# Patient Record
Sex: FEMALE | Race: WHITE | Employment: FULL TIME | URBAN - METROPOLITAN AREA
[De-identification: names, ages, dates, MRNs, and addresses within clinical notes are randomized per-mention and may not be internally consistent; named-entity substitution may affect disease eponyms.]

---

## 2017-10-17 PROBLEM — G50.0 TRIGEMINAL NEURALGIA OF RIGHT SIDE OF FACE: Status: ACTIVE | Noted: 2017-10-17

## 2018-04-02 PROBLEM — I10 MALIGNANT HYPERTENSION: Status: ACTIVE | Noted: 2018-04-02

## 2019-07-23 ENCOUNTER — HOSPITAL ENCOUNTER (OUTPATIENT)
Dept: VASCULAR SURGERY | Age: 49
Discharge: HOME | End: 2019-07-23
Payer: COMMERCIAL

## 2019-07-23 DIAGNOSIS — I10 ESSENTIAL HYPERTENSION, MALIGNANT: ICD-10-CM

## 2019-07-23 PROCEDURE — 93975 VASCULAR STUDY: CPT

## 2019-07-24 LAB
ABDOMINAL PROX AORTA VEL: 114.8 CM/S
CELIAC PSV: 278.4 CM/S
LEFT KIDNEY LENGTH: 12.39 CM
LEFT KIDNEY WIDTH: 5.15 CM
LEFT RENAL DIST DIAS: 56.8 CM/S
LEFT RENAL DIST RAR: 1.37
LEFT RENAL DIST RI: 0.64
LEFT RENAL DIST SYS: 157.8 CM/S
LEFT RENAL LOWER PARENCHYMA MAX: 34 CM/S
LEFT RENAL LOWER PARENCHYMA MIN: 14.8 CM/S
LEFT RENAL LOWER PARENCHYMA RI: 0.56
LEFT RENAL MID DIAS: 37.1 CM/S
LEFT RENAL MID RAR: 1.09
LEFT RENAL MID RI: 0.7
LEFT RENAL MID SYS: 124.9 CM/S
LEFT RENAL MIDDLE PARENCHYMA MAX: 29.4 CM/S
LEFT RENAL MIDDLE PARENCHYMA MIN: 15.7 CM/S
LEFT RENAL MIDDLE PARENCHYMA RI: 0.47
LEFT RENAL PROX DIAS: 29.5 CM/S
LEFT RENAL PROX RAR: 0.61
LEFT RENAL PROX RI: 0.58
LEFT RENAL PROX SYS: 70.1 CM/S
LEFT RENAL UPPER PARENCHYMA MAX: 29.4 CM/S
LEFT RENAL UPPER PARENCHYMA MIN: 15.7 CM/S
LEFT RENAL UPPER PARENCHYMA RI: 0.47
PROX AORTIC AP: 1.37 CM
PROX AORTIC TRANS: 1.33 CM
PROX SMA PSV: 309.2 CM/S
RIGHT KIDNEY LENGTH: 10.83 CM
RIGHT KIDNEY WIDTH: 5.12 CM
RIGHT RENAL DIST DIAS: 75 CM/S
RIGHT RENAL DIST RAR: 1.41
RIGHT RENAL DIST RI: 0.54
RIGHT RENAL DIST SYS: 162.4 CM/S
RIGHT RENAL LOWER PARENCHYMA MAX: 25.8 CM/S
RIGHT RENAL LOWER PARENCHYMA MIN: 11 CM/S
RIGHT RENAL LOWER PARENCHYMA RI: 0.57
RIGHT RENAL MID DIAS: 58.9 CM/S
RIGHT RENAL MID RAR: 1.22
RIGHT RENAL MID RI: 0.58
RIGHT RENAL MID SYS: 139.7 CM/S
RIGHT RENAL MIDDLE PARENCHYMA MAX: 44.9 CM/S
RIGHT RENAL MIDDLE PARENCHYMA MIN: 18.4 CM/S
RIGHT RENAL MIDDLE PARENCHYMA RI: 0.59
RIGHT RENAL PROX DIAS: 62.1 CM/S
RIGHT RENAL PROX RAR: 1.4
RIGHT RENAL PROX RI: 0.61
RIGHT RENAL PROX SYS: 160.5 CM/S
RIGHT RENAL UPPER PARENCHYMA MAX: 43.1 CM/S
RIGHT RENAL UPPER PARENCHYMA MIN: 18.4 CM/S
RIGHT RENAL UPPER PARENCHYMA RI: 0.57

## 2019-09-19 PROBLEM — I10 MALIGNANT HYPERTENSION: Status: RESOLVED | Noted: 2018-04-02 | Resolved: 2019-09-19

## 2019-09-19 PROBLEM — E11.59 HYPERTENSION COMPLICATING DIABETES (HCC): Status: ACTIVE | Noted: 2019-09-19

## 2019-09-19 PROBLEM — Z79.899 ENCOUNTER FOR LONG-TERM (CURRENT) USE OF OTHER MEDICATIONS: Status: ACTIVE | Noted: 2019-09-19

## 2019-09-19 PROBLEM — E11.9 CONTROLLED TYPE 2 DIABETES MELLITUS WITHOUT COMPLICATION, WITHOUT LONG-TERM CURRENT USE OF INSULIN (HCC): Status: ACTIVE | Noted: 2019-09-19

## 2019-09-19 PROBLEM — I15.2 HYPERTENSION COMPLICATING DIABETES (HCC): Status: ACTIVE | Noted: 2019-09-19

## 2020-03-02 ENCOUNTER — HOSPITAL ENCOUNTER (OUTPATIENT)
Dept: CT IMAGING | Age: 50
Discharge: HOME | End: 2020-03-02
Attending: SURGERY
Payer: COMMERCIAL

## 2020-03-02 DIAGNOSIS — K55.9 VASCULAR INSUFFICIENCY OF INTESTINE (HCC): ICD-10-CM

## 2020-03-02 DIAGNOSIS — K55.1: ICD-10-CM

## 2020-03-02 LAB — CREAT BLD-MCNC: 0.5 MG/DL (ref 0.6–1.3)

## 2020-03-02 PROCEDURE — 74174 CTA ABD&PLVS W/CONTRAST: CPT

## 2020-03-02 PROCEDURE — 74011000258 HC RX REV CODE- 258: Performed by: RADIOLOGY

## 2020-03-02 PROCEDURE — 74011636320 HC RX REV CODE- 636/320: Performed by: RADIOLOGY

## 2020-03-02 PROCEDURE — 82565 ASSAY OF CREATININE: CPT

## 2020-03-02 RX ORDER — SODIUM CHLORIDE 0.9 % (FLUSH) 0.9 %
10 SYRINGE (ML) INJECTION
Status: COMPLETED | OUTPATIENT
Start: 2020-03-02 | End: 2020-03-02

## 2020-03-02 RX ADMIN — Medication 10 ML: at 11:59

## 2020-03-02 RX ADMIN — IOPAMIDOL 100 ML: 755 INJECTION, SOLUTION INTRAVENOUS at 11:59

## 2020-03-02 RX ADMIN — SODIUM CHLORIDE 100 ML: 900 INJECTION, SOLUTION INTRAVENOUS at 11:59

## 2020-05-13 ENCOUNTER — HOSPITAL ENCOUNTER (OUTPATIENT)
Dept: MRI IMAGING | Age: 50
Discharge: HOME | End: 2020-05-13
Attending: INTERNAL MEDICINE
Payer: COMMERCIAL

## 2020-05-13 VITALS — WEIGHT: 177 LBS | BODY MASS INDEX: 31.35 KG/M2

## 2020-05-13 DIAGNOSIS — R51.9 NONINTRACTABLE HEADACHE, UNSPECIFIED CHRONICITY PATTERN, UNSPECIFIED HEADACHE TYPE: ICD-10-CM

## 2020-05-13 DIAGNOSIS — H53.9 VISUAL DISTURBANCE OF ONE EYE: ICD-10-CM

## 2020-05-13 DIAGNOSIS — H53.2 DOUBLE VISION: ICD-10-CM

## 2020-05-13 PROCEDURE — A9575 INJ GADOTERATE MEGLUMI 0.1ML: HCPCS | Performed by: INTERNAL MEDICINE

## 2020-05-13 PROCEDURE — 74011250636 HC RX REV CODE- 250/636: Performed by: INTERNAL MEDICINE

## 2020-05-13 PROCEDURE — 70553 MRI BRAIN STEM W/O & W/DYE: CPT

## 2020-05-13 RX ORDER — GADOTERATE MEGLUMINE 376.9 MG/ML
15 INJECTION INTRAVENOUS ONCE
Status: COMPLETED | OUTPATIENT
Start: 2020-05-13 | End: 2020-05-13

## 2020-05-13 RX ADMIN — GADOTERATE MEGLUMINE 15 ML: 376.9 INJECTION INTRAVENOUS at 12:21

## 2020-09-15 ENCOUNTER — HOSPITAL ENCOUNTER (OUTPATIENT)
Dept: GENERAL RADIOLOGY | Age: 50
Discharge: HOME | End: 2020-09-15
Attending: INTERNAL MEDICINE
Payer: COMMERCIAL

## 2020-09-15 DIAGNOSIS — M25.559 HIP PAIN: ICD-10-CM

## 2020-09-15 PROCEDURE — 73521 X-RAY EXAM HIPS BI 2 VIEWS: CPT

## 2020-12-03 ENCOUNTER — HOSPITAL ENCOUNTER (OUTPATIENT)
Dept: ULTRASOUND IMAGING | Age: 50
Discharge: HOME | End: 2020-12-03
Attending: FAMILY MEDICINE
Payer: COMMERCIAL

## 2020-12-03 DIAGNOSIS — R10.11 RUQ ABDOMINAL PAIN: ICD-10-CM

## 2020-12-03 PROCEDURE — 76705 ECHO EXAM OF ABDOMEN: CPT

## 2020-12-10 ENCOUNTER — HOSPITAL ENCOUNTER (OUTPATIENT)
Dept: NUCLEAR MEDICINE | Age: 50
Discharge: HOME | End: 2020-12-10
Attending: INTERNAL MEDICINE
Payer: COMMERCIAL

## 2020-12-10 VITALS — WEIGHT: 189 LBS | BODY MASS INDEX: 33.48 KG/M2

## 2020-12-10 DIAGNOSIS — R10.11 RUQ PAIN: ICD-10-CM

## 2020-12-10 DIAGNOSIS — R79.89 ELEVATED LFTS: ICD-10-CM

## 2020-12-10 PROCEDURE — 74011000258 HC RX REV CODE- 258: Performed by: INTERNAL MEDICINE

## 2020-12-10 PROCEDURE — 78227 HEPATOBIL SYST IMAGE W/DRUG: CPT

## 2020-12-10 PROCEDURE — 74011250636 HC RX REV CODE- 250/636: Performed by: INTERNAL MEDICINE

## 2020-12-10 RX ORDER — SODIUM CHLORIDE 9 MG/ML
50 INJECTION, SOLUTION INTRAVENOUS
Status: COMPLETED | OUTPATIENT
Start: 2020-12-10 | End: 2020-12-10

## 2020-12-10 RX ADMIN — SODIUM CHLORIDE 50 ML: 900 INJECTION, SOLUTION INTRAVENOUS at 12:30

## 2020-12-10 RX ADMIN — SINCALIDE 1.71 MCG: 5 INJECTION, POWDER, LYOPHILIZED, FOR SOLUTION INTRAVENOUS at 12:30

## 2020-12-15 ENCOUNTER — OFFICE VISIT (OUTPATIENT)
Dept: SURGERY | Age: 50
End: 2020-12-15
Payer: COMMERCIAL

## 2020-12-15 VITALS
HEART RATE: 106 BPM | DIASTOLIC BLOOD PRESSURE: 99 MMHG | HEIGHT: 63 IN | WEIGHT: 189 LBS | RESPIRATION RATE: 18 BRPM | BODY MASS INDEX: 33.49 KG/M2 | SYSTOLIC BLOOD PRESSURE: 152 MMHG | TEMPERATURE: 98.7 F | OXYGEN SATURATION: 96 %

## 2020-12-15 DIAGNOSIS — R74.8 ELEVATED LIVER ENZYMES: ICD-10-CM

## 2020-12-15 DIAGNOSIS — K82.8 BILIARY DYSKINESIA: Primary | ICD-10-CM

## 2020-12-15 PROCEDURE — 99204 OFFICE O/P NEW MOD 45 MIN: CPT | Performed by: SURGERY

## 2020-12-15 NOTE — PROGRESS NOTES
[employer] [facility] at [facility] Surgery History and Physical    History of Present Illness:      Nadiya Mitchell is a 48 y.o. female who has been having right upper quadrant epigastric abdominal pain for the last few months. She periodically has epigastric and right upper quadrant pain that appears to be mostly random. It is not always related to eating food. When she has the pain it can be an 8 out of 10. Sometimes the pain can last 6 to 8 hours. She has had some nausea. She has had no changes in bowel movements no diarrhea    Past Medical History:   Diagnosis Date    Anorexia     Bulimia     Depression     Endometriosis     High cholesterol     Kidney stone     Migraines     Ovarian cyst     Proteinuria     mild       Past Surgical History:   Procedure Laterality Date    HX GYN      endometrial oblation  11/03    HX MELLY AND BSO  02/2020         Current Outpatient Medications:     dextroamphetamine-amphetamine (ADDERALL) 20 mg tablet, Take 1 Tab by mouth two (2) times a day., Disp: 60 Tab, Rfl: 0    amLODIPine (NORVASC) 10 mg tablet, Take 1 Tab by mouth daily. , Disp: 90 Tab, Rfl: 3    estradioL (Estrace) 1 mg tablet, Take 1 mg by mouth daily. , Disp: , Rfl:     FLUoxetine (PROZAC) 20 mg capsule, Take 1 Cap by mouth daily. Take 4 capsules QD, Disp: 360 Cap, Rfl: 3    nebivolol (BYSTOLIC) 20 mg tablet, Take 40 mg by mouth daily. , Disp: , Rfl:     atorvastatin (LIPITOR) 20 mg tablet, Take 1 Tab by mouth daily. , Disp: 90 Tab, Rfl: 3    lisinopril-hydroCHLOROthiazide (PRINZIDE, ZESTORETIC) 20-12.5 mg per tablet, Take 2 Tabs by mouth daily. , Disp: 180 Tab, Rfl: 3    metFORMIN (GLUCOPHAGE) 500 mg tablet, Take 1 Tab by mouth daily (with breakfast). , Disp: 30 Tab, Rfl: 6    butalbital-acetaminophen-caffeine (FIORICET, ESGIC) -40 mg per tablet, Take 1-2 Tabs by mouth four (4) times daily as needed for Headache., Disp: 30 Tab, Rfl: 5    Allergies   Allergen Reactions    Sulfa (Sulfonamide Antibiotics) Unknown (comments)       Social History     Socioeconomic History    Marital status:      Spouse name: Not on file    Number of children: Not on file    Years of education: Not on file    Highest education level: Not on file   Occupational History    Not on file   Social Needs    Financial resource strain: Not on file    Food insecurity     Worry: Not on file     Inability: Not on file    Transportation needs     Medical: Not on file     Non-medical: Not on file   Tobacco Use    Smoking status: Never Smoker    Smokeless tobacco: Never Used   Substance and Sexual Activity    Alcohol use: Not on file    Drug use: Not on file    Sexual activity: Not on file   Lifestyle    Physical activity     Days per week: Not on file     Minutes per session: Not on file    Stress: Not on file   Relationships    Social connections     Talks on phone: Not on file     Gets together: Not on file     Attends Jew service: Not on file     Active member of club or organization: Not on file     Attends meetings of clubs or organizations: Not on file     Relationship status: Not on file    Intimate partner violence     Fear of current or ex partner: Not on file     Emotionally abused: Not on file     Physically abused: Not on file     Forced sexual activity: Not on file   Other Topics Concern    Not on file   Social History Narrative    Not on file       Family History   Problem Relation Age of Onset    Diabetes Father     Arthritis-rheumatoid Father     Diabetes Sister        ROS   Constitutional: negative  Ears, Nose, Mouth, Throat, and Face: negative  Respiratory: negative  Cardiovascular: negative  Gastrointestinal: positive for nausea and abdominal pain  Genitourinary:negative  Integument/Breast: negative  Hematologic/Lymphatic: negative  Behavioral/Psychiatric: negative  Allergic/Immunologic: negative      Physical Exam:     Visit Vitals  BP (!) 152/99 (BP 1 Location: Left arm, BP Patient Position: Sitting)   Pulse (!) 106   Temp 98.7 °F (37.1 °C) (Oral)   Resp 18   Ht 5' 3\" (1.6 m)   Wt 189 lb (85.7 kg)   SpO2 96%   BMI 33.48 kg/m²       General - alert and oriented, no apparent distress  HEENT - no jaundice, no hearing imparement  Pulm - CTAB, no C/W/R  CV - RRR, no M/R/G  Abd -soft, nondistended, bowel sounds present, mild tenderness to palpation right upper quadrant epigastric, no hernia  Ext - pulses intact in UE and LE bilaterally, no edema  Skin - supple, no rashes  Psychiatric - normal affect, good mood    Labs  Lab Results   Component Value Date/Time    Sodium 139 12/08/2020 08:15 AM    Potassium 4.6 12/08/2020 08:15 AM    Chloride 101 12/08/2020 08:15 AM    CO2 22 12/08/2020 08:15 AM    Glucose 148 (H) 12/08/2020 08:15 AM    BUN 10 12/08/2020 08:15 AM    Creatinine 0.64 12/08/2020 08:15 AM    BUN/Creatinine ratio 16 12/08/2020 08:15 AM    GFR est  12/08/2020 08:15 AM    GFR est non- 12/08/2020 08:15 AM    Calcium 9.4 12/08/2020 08:15 AM    Bilirubin, total 0.3 12/08/2020 08:15 AM    Alk. phosphatase 131 (H) 12/08/2020 08:15 AM    Protein, total 7.2 12/08/2020 08:15 AM    Albumin 4.1 12/08/2020 08:15 AM    A-G Ratio 1.3 12/08/2020 08:15 AM    ALT (SGPT) 53 (H) 12/08/2020 08:15 AM    AST (SGOT) 20 12/08/2020 08:15 AM     Lab Results   Component Value Date/Time    WBC 5.3 12/08/2020 08:15 AM    HGB (POC) 12.6 04/02/2018 11:50 AM    HGB 13.6 12/08/2020 08:15 AM    HCT (POC) 38.9 04/02/2018 11:50 AM    HCT 39.8 12/08/2020 08:15 AM    PLATELET 561 96/96/8342 08:15 AM    MCV 90 12/08/2020 08:15 AM         Imaging  RUQ US - normal    HIDA -ejection fraction 11% otherwise normal  I have reviewed and agree with all of the pertinent images    Assessment:     Kevin Bower is a 48 y.o. female with biliary dyskinesia    Recommendations:     1. She does appear to have biliary dyskinesia.   She also describes having some slightly elevated liver enzymes on her previous labs. On her current labs her liver enzymes are slightly elevated. Due to this I will also do a cholangiogram at the time of operation. She will need to be scheduled for laparoscopic cholecystectomy with cholangiogram. I have discussed the above procedure with the patient in detail. We reviewed the benefits and possible complications of the surgery which include bleeding, infection, damage to adjacent organs, venous thromboembolism, need for repeat surgery, death and other unforseen complications. The patient agreed to proceed with the surgery. Cody Coleman MD    Greater than half of the time: 30 minutes was used in counciling the patient about diagnosis and treatment plan    Ms. Yvonne Bey has a reminder for a \"due or due soon\" health maintenance. I have asked that she contact her primary care provider for follow-up on this health maintenance.

## 2020-12-15 NOTE — LETTER
12/15/20 Patient: Tremayne Cai YOB: 1970 Date of Visit: 12/15/2020 Vignesh Blake MD 
[facility] 7 [id number] VIA In Basket Dear Vignesh Blake MD, Thank you for referring Ms. John Murphy to [facility] for evaluation. My notes for this consultation are attached. If you have questions, please do not hesitate to call me. I look forward to following your patient along with you. Sincerely, Carlee Leon MD

## 2020-12-15 NOTE — PROGRESS NOTES
1. Have you been to the ER, urgent care clinic since your last visit? Hospitalized since your last visit? No    2. Have you seen or consulted any other health care providers outside of the [address] [place] [address] since your last visit? Include any pap smears or colon screening.  No

## 2020-12-16 ENCOUNTER — PATIENT MESSAGE (OUTPATIENT)
Dept: SURGERY | Age: 50
End: 2020-12-16

## 2020-12-27 ENCOUNTER — HOSPITAL ENCOUNTER (EMERGENCY)
Age: 50
Discharge: HOME | End: 2020-12-27
Attending: EMERGENCY MEDICINE | Admitting: SURGERY
Payer: COMMERCIAL

## 2020-12-27 ENCOUNTER — ANESTHESIA (OUTPATIENT)
Dept: SURGERY | Age: 50
End: 2020-12-27
Payer: COMMERCIAL

## 2020-12-27 ENCOUNTER — ANESTHESIA EVENT (OUTPATIENT)
Dept: SURGERY | Age: 50
End: 2020-12-27
Payer: COMMERCIAL

## 2020-12-27 ENCOUNTER — APPOINTMENT (OUTPATIENT)
Dept: ULTRASOUND IMAGING | Age: 50
End: 2020-12-27
Attending: NURSE PRACTITIONER
Payer: COMMERCIAL

## 2020-12-27 VITALS
TEMPERATURE: 97.5 F | HEART RATE: 97 BPM | RESPIRATION RATE: 12 BRPM | DIASTOLIC BLOOD PRESSURE: 76 MMHG | OXYGEN SATURATION: 87 % | SYSTOLIC BLOOD PRESSURE: 137 MMHG

## 2020-12-27 DIAGNOSIS — K80.50 BILIARY COLIC: Primary | ICD-10-CM

## 2020-12-27 DIAGNOSIS — K81.0 ACUTE CHOLECYSTITIS: ICD-10-CM

## 2020-12-27 DIAGNOSIS — Z20.822 SUSPECTED COVID-19 VIRUS INFECTION: ICD-10-CM

## 2020-12-27 PROBLEM — K80.00 ACUTE CHOLECYSTITIS DUE TO BILIARY CALCULUS: Status: ACTIVE | Noted: 2020-12-27

## 2020-12-27 PROBLEM — K80.20 SYMPTOMATIC CHOLELITHIASIS: Status: ACTIVE | Noted: 2020-12-27

## 2020-12-27 LAB
ALBUMIN SERPL-MCNC: 3.7 G/DL (ref 3.5–5)
ALBUMIN/GLOB SERPL: 0.9 {RATIO} (ref 1.1–2.2)
ALP SERPL-CCNC: 121 U/L (ref 45–117)
ALT SERPL-CCNC: 218 U/L (ref 12–78)
ANION GAP SERPL CALC-SCNC: 3 MMOL/L (ref 5–15)
APPEARANCE UR: CLEAR
AST SERPL-CCNC: 284 U/L (ref 15–37)
ATRIAL RATE: 79 BPM
BACTERIA URNS QL MICRO: NEGATIVE /HPF
BASOPHILS # BLD: 0 K/UL (ref 0–0.1)
BASOPHILS NFR BLD: 1 % (ref 0–1)
BILIRUB SERPL-MCNC: 0.7 MG/DL (ref 0.2–1)
BILIRUB UR QL: NEGATIVE
BUN SERPL-MCNC: 11 MG/DL (ref 6–20)
BUN/CREAT SERPL: 16 (ref 12–20)
CALCIUM SERPL-MCNC: 8.8 MG/DL (ref 8.5–10.1)
CALCULATED P AXIS, ECG09: 56 DEGREES
CALCULATED R AXIS, ECG10: 46 DEGREES
CALCULATED T AXIS, ECG11: 40 DEGREES
CHLORIDE SERPL-SCNC: 106 MMOL/L (ref 97–108)
CO2 SERPL-SCNC: 28 MMOL/L (ref 21–32)
COLOR UR: ABNORMAL
COMMENT, HOLDF: NORMAL
COVID-19 RAPID TEST, COVR: NOT DETECTED
CREAT SERPL-MCNC: 0.69 MG/DL (ref 0.55–1.02)
DIAGNOSIS, 93000: NORMAL
DIFFERENTIAL METHOD BLD: NORMAL
EOSINOPHIL # BLD: 0 K/UL (ref 0–0.4)
EOSINOPHIL NFR BLD: 0 % (ref 0–7)
EPITH CASTS URNS QL MICRO: ABNORMAL /LPF
ERYTHROCYTE [DISTWIDTH] IN BLOOD BY AUTOMATED COUNT: 11.9 % (ref 11.5–14.5)
GLOBULIN SER CALC-MCNC: 4.1 G/DL (ref 2–4)
GLUCOSE BLD STRIP.AUTO-MCNC: 104 MG/DL (ref 65–100)
GLUCOSE SERPL-MCNC: 154 MG/DL (ref 65–100)
GLUCOSE UR STRIP.AUTO-MCNC: 100 MG/DL
HCT VFR BLD AUTO: 41.6 % (ref 35–47)
HGB BLD-MCNC: 13.7 G/DL (ref 11.5–16)
HGB UR QL STRIP: NEGATIVE
IMM GRANULOCYTES # BLD AUTO: 0 K/UL (ref 0–0.04)
IMM GRANULOCYTES NFR BLD AUTO: 0 % (ref 0–0.5)
KETONES UR QL STRIP.AUTO: NEGATIVE MG/DL
LEUKOCYTE ESTERASE UR QL STRIP.AUTO: NEGATIVE
LIPASE SERPL-CCNC: 172 U/L (ref 73–393)
LYMPHOCYTES # BLD: 1.1 K/UL (ref 0.8–3.5)
LYMPHOCYTES NFR BLD: 19 % (ref 12–49)
MCH RBC QN AUTO: 29.9 PG (ref 26–34)
MCHC RBC AUTO-ENTMCNC: 32.9 G/DL (ref 30–36.5)
MCV RBC AUTO: 90.8 FL (ref 80–99)
MONOCYTES # BLD: 0.3 K/UL (ref 0–1)
MONOCYTES NFR BLD: 5 % (ref 5–13)
MUCOUS THREADS URNS QL MICRO: ABNORMAL /LPF
NEUTS SEG # BLD: 4.1 K/UL (ref 1.8–8)
NEUTS SEG NFR BLD: 75 % (ref 32–75)
NITRITE UR QL STRIP.AUTO: NEGATIVE
NRBC # BLD: 0 K/UL (ref 0–0.01)
NRBC BLD-RTO: 0 PER 100 WBC
P-R INTERVAL, ECG05: 156 MS
PH UR STRIP: 7.5 [PH] (ref 5–8)
PLATELET # BLD AUTO: 322 K/UL (ref 150–400)
PMV BLD AUTO: 11.1 FL (ref 8.9–12.9)
POTASSIUM SERPL-SCNC: 3.9 MMOL/L (ref 3.5–5.1)
PROT SERPL-MCNC: 7.8 G/DL (ref 6.4–8.2)
PROT UR STRIP-MCNC: NEGATIVE MG/DL
Q-T INTERVAL, ECG07: 398 MS
QRS DURATION, ECG06: 76 MS
QTC CALCULATION (BEZET), ECG08: 456 MS
RBC # BLD AUTO: 4.58 M/UL (ref 3.8–5.2)
RBC #/AREA URNS HPF: ABNORMAL /HPF (ref 0–5)
SAMPLES BEING HELD,HOLD: NORMAL
SERVICE CMNT-IMP: ABNORMAL
SODIUM SERPL-SCNC: 137 MMOL/L (ref 136–145)
SOURCE, COVRS: NORMAL
SP GR UR REFRACTOMETRY: 1.02 (ref 1–1.03)
SPECIMEN SOURCE, FCOV2M: NORMAL
SPECIMEN TYPE, XMCV1T: NORMAL
UR CULT HOLD, URHOLD: NORMAL
UROBILINOGEN UR QL STRIP.AUTO: 1 EU/DL (ref 0.2–1)
VENTRICULAR RATE, ECG03: 79 BPM
WBC # BLD AUTO: 5.6 K/UL (ref 3.6–11)
WBC URNS QL MICRO: ABNORMAL /HPF (ref 0–4)

## 2020-12-27 PROCEDURE — 81001 URINALYSIS AUTO W/SCOPE: CPT

## 2020-12-27 PROCEDURE — 77030010507 HC ADH SKN DERMBND J&J -B: Performed by: SURGERY

## 2020-12-27 PROCEDURE — 96361 HYDRATE IV INFUSION ADD-ON: CPT

## 2020-12-27 PROCEDURE — 76010000153 HC OR TIME 1.5 TO 2 HR: Performed by: SURGERY

## 2020-12-27 PROCEDURE — 77030008684 HC TU ET CUF COVD -B: Performed by: ANESTHESIOLOGY

## 2020-12-27 PROCEDURE — 77030020263 HC SOL INJ SOD CL0.9% LFCR 1000ML: Performed by: SURGERY

## 2020-12-27 PROCEDURE — 2709999900 HC NON-CHARGEABLE SUPPLY: Performed by: SURGERY

## 2020-12-27 PROCEDURE — 99284 EMERGENCY DEPT VISIT MOD MDM: CPT

## 2020-12-27 PROCEDURE — 76705 ECHO EXAM OF ABDOMEN: CPT

## 2020-12-27 PROCEDURE — 47562 LAPAROSCOPIC CHOLECYSTECTOMY: CPT | Performed by: SURGERY

## 2020-12-27 PROCEDURE — 80053 COMPREHEN METABOLIC PANEL: CPT

## 2020-12-27 PROCEDURE — 83690 ASSAY OF LIPASE: CPT

## 2020-12-27 PROCEDURE — 76210000017 HC OR PH I REC 1.5 TO 2 HR: Performed by: SURGERY

## 2020-12-27 PROCEDURE — 77030013079 HC BLNKT BAIR HGGR 3M -A: Performed by: ANESTHESIOLOGY

## 2020-12-27 PROCEDURE — 93005 ELECTROCARDIOGRAM TRACING: CPT

## 2020-12-27 PROCEDURE — 88304 TISSUE EXAM BY PATHOLOGIST: CPT

## 2020-12-27 PROCEDURE — 99221 1ST HOSP IP/OBS SF/LOW 40: CPT | Performed by: SURGERY

## 2020-12-27 PROCEDURE — 47001 NDL BIOPSY LVR TM OTH MAJ PX: CPT | Performed by: SURGERY

## 2020-12-27 PROCEDURE — 36415 COLL VENOUS BLD VENIPUNCTURE: CPT

## 2020-12-27 PROCEDURE — 74011250636 HC RX REV CODE- 250/636: Performed by: ANESTHESIOLOGY

## 2020-12-27 PROCEDURE — 77030037032 HC INSRT SCIS CLICKLLINE DISP STOR -B: Performed by: SURGERY

## 2020-12-27 PROCEDURE — 74011000250 HC RX REV CODE- 250: Performed by: SURGERY

## 2020-12-27 PROCEDURE — 74011250636 HC RX REV CODE- 250/636: Performed by: NURSE PRACTITIONER

## 2020-12-27 PROCEDURE — 74011250637 HC RX REV CODE- 250/637: Performed by: NURSE ANESTHETIST, CERTIFIED REGISTERED

## 2020-12-27 PROCEDURE — 74011250636 HC RX REV CODE- 250/636: Performed by: SURGERY

## 2020-12-27 PROCEDURE — 96375 TX/PRO/DX INJ NEW DRUG ADDON: CPT

## 2020-12-27 PROCEDURE — 77030003481 HC NDL BIOP GUN BARD -B: Performed by: SURGERY

## 2020-12-27 PROCEDURE — 96374 THER/PROPH/DIAG INJ IV PUSH: CPT

## 2020-12-27 PROCEDURE — 77030040361 HC SLV COMPR DVT MDII -B: Performed by: SURGERY

## 2020-12-27 PROCEDURE — 76060000034 HC ANESTHESIA 1.5 TO 2 HR: Performed by: SURGERY

## 2020-12-27 PROCEDURE — 74011000250 HC RX REV CODE- 250: Performed by: NURSE ANESTHETIST, CERTIFIED REGISTERED

## 2020-12-27 PROCEDURE — 77030019908 HC STETH ESOPH SIMS -A: Performed by: ANESTHESIOLOGY

## 2020-12-27 PROCEDURE — 77030026438 HC STYL ET INTUB CARD -A: Performed by: ANESTHESIOLOGY

## 2020-12-27 PROCEDURE — 77030012770 HC TRCR OPT FX AMR -B: Performed by: SURGERY

## 2020-12-27 PROCEDURE — 77030007955 HC PCH ENDOSC SPEC J&J -B: Performed by: SURGERY

## 2020-12-27 PROCEDURE — 77030020053 HC ELECTRD LAPSCP COVD -B: Performed by: SURGERY

## 2020-12-27 PROCEDURE — 77030020829: Performed by: SURGERY

## 2020-12-27 PROCEDURE — 77030002966 HC SUT PDS J&J -A: Performed by: SURGERY

## 2020-12-27 PROCEDURE — 74011250636 HC RX REV CODE- 250/636: Performed by: NURSE ANESTHETIST, CERTIFIED REGISTERED

## 2020-12-27 PROCEDURE — 88307 TISSUE EXAM BY PATHOLOGIST: CPT

## 2020-12-27 PROCEDURE — 87635 SARS-COV-2 COVID-19 AMP PRB: CPT

## 2020-12-27 PROCEDURE — 77030008608 HC TRCR ENDOSC SMTH AMR -B: Performed by: SURGERY

## 2020-12-27 PROCEDURE — 82962 GLUCOSE BLOOD TEST: CPT

## 2020-12-27 PROCEDURE — 77030008756 HC TU IRR SUC STRY -B: Performed by: SURGERY

## 2020-12-27 PROCEDURE — 77030010513 HC APPL CLP LIG J&J -C: Performed by: SURGERY

## 2020-12-27 PROCEDURE — 85025 COMPLETE CBC W/AUTO DIFF WBC: CPT

## 2020-12-27 PROCEDURE — 77030002933 HC SUT MCRYL J&J -A: Performed by: SURGERY

## 2020-12-27 PROCEDURE — 88313 SPECIAL STAINS GROUP 2: CPT

## 2020-12-27 RX ORDER — DEXAMETHASONE SODIUM PHOSPHATE 4 MG/ML
INJECTION, SOLUTION INTRA-ARTICULAR; INTRALESIONAL; INTRAMUSCULAR; INTRAVENOUS; SOFT TISSUE AS NEEDED
Status: DISCONTINUED | OUTPATIENT
Start: 2020-12-27 | End: 2020-12-27 | Stop reason: HOSPADM

## 2020-12-27 RX ORDER — MIDAZOLAM HYDROCHLORIDE 1 MG/ML
1 INJECTION, SOLUTION INTRAMUSCULAR; INTRAVENOUS AS NEEDED
Status: DISCONTINUED | OUTPATIENT
Start: 2020-12-27 | End: 2020-12-27 | Stop reason: HOSPADM

## 2020-12-27 RX ORDER — SODIUM CHLORIDE, SODIUM LACTATE, POTASSIUM CHLORIDE, CALCIUM CHLORIDE 600; 310; 30; 20 MG/100ML; MG/100ML; MG/100ML; MG/100ML
125 INJECTION, SOLUTION INTRAVENOUS CONTINUOUS
Status: DISCONTINUED | OUTPATIENT
Start: 2020-12-27 | End: 2020-12-27 | Stop reason: HOSPADM

## 2020-12-27 RX ORDER — HYDROMORPHONE HYDROCHLORIDE 1 MG/ML
1 INJECTION, SOLUTION INTRAMUSCULAR; INTRAVENOUS; SUBCUTANEOUS
Status: DISCONTINUED | OUTPATIENT
Start: 2020-12-27 | End: 2020-12-27 | Stop reason: HOSPADM

## 2020-12-27 RX ORDER — FENTANYL CITRATE 50 UG/ML
INJECTION, SOLUTION INTRAMUSCULAR; INTRAVENOUS AS NEEDED
Status: DISCONTINUED | OUTPATIENT
Start: 2020-12-27 | End: 2020-12-27 | Stop reason: HOSPADM

## 2020-12-27 RX ORDER — SODIUM CHLORIDE 0.9 % (FLUSH) 0.9 %
5-40 SYRINGE (ML) INJECTION EVERY 8 HOURS
Status: DISCONTINUED | OUTPATIENT
Start: 2020-12-27 | End: 2020-12-27 | Stop reason: HOSPADM

## 2020-12-27 RX ORDER — ONDANSETRON 2 MG/ML
4 INJECTION INTRAMUSCULAR; INTRAVENOUS AS NEEDED
Status: DISCONTINUED | OUTPATIENT
Start: 2020-12-27 | End: 2020-12-27 | Stop reason: HOSPADM

## 2020-12-27 RX ORDER — ONDANSETRON 2 MG/ML
8 INJECTION INTRAMUSCULAR; INTRAVENOUS
Status: COMPLETED | OUTPATIENT
Start: 2020-12-27 | End: 2020-12-27

## 2020-12-27 RX ORDER — MORPHINE SULFATE 4 MG/ML
4 INJECTION INTRAVENOUS ONCE
Status: COMPLETED | OUTPATIENT
Start: 2020-12-27 | End: 2020-12-27

## 2020-12-27 RX ORDER — FENTANYL CITRATE 50 UG/ML
25 INJECTION, SOLUTION INTRAMUSCULAR; INTRAVENOUS
Status: COMPLETED | OUTPATIENT
Start: 2020-12-27 | End: 2020-12-27

## 2020-12-27 RX ORDER — KETAMINE HYDROCHLORIDE 10 MG/ML
INJECTION, SOLUTION INTRAMUSCULAR; INTRAVENOUS AS NEEDED
Status: DISCONTINUED | OUTPATIENT
Start: 2020-12-27 | End: 2020-12-27 | Stop reason: HOSPADM

## 2020-12-27 RX ORDER — LIDOCAINE HYDROCHLORIDE 20 MG/ML
INJECTION, SOLUTION EPIDURAL; INFILTRATION; INTRACAUDAL; PERINEURAL AS NEEDED
Status: DISCONTINUED | OUTPATIENT
Start: 2020-12-27 | End: 2020-12-27 | Stop reason: HOSPADM

## 2020-12-27 RX ORDER — OXYCODONE HYDROCHLORIDE 5 MG/1
5 TABLET ORAL AS NEEDED
Status: DISCONTINUED | OUTPATIENT
Start: 2020-12-27 | End: 2020-12-27 | Stop reason: HOSPADM

## 2020-12-27 RX ORDER — ACETAMINOPHEN 325 MG/1
650 TABLET ORAL ONCE
Status: DISCONTINUED | OUTPATIENT
Start: 2020-12-27 | End: 2020-12-27 | Stop reason: HOSPADM

## 2020-12-27 RX ORDER — PROPOFOL 10 MG/ML
INJECTION, EMULSION INTRAVENOUS AS NEEDED
Status: DISCONTINUED | OUTPATIENT
Start: 2020-12-27 | End: 2020-12-27 | Stop reason: HOSPADM

## 2020-12-27 RX ORDER — POLYETHYLENE GLYCOL 3350 17 G/17G
17 POWDER, FOR SOLUTION ORAL DAILY
Qty: 14 PACKET | Refills: 1 | Status: SHIPPED | OUTPATIENT
Start: 2020-12-27 | End: 2021-11-08

## 2020-12-27 RX ORDER — FENTANYL CITRATE 50 UG/ML
50 INJECTION, SOLUTION INTRAMUSCULAR; INTRAVENOUS AS NEEDED
Status: DISCONTINUED | OUTPATIENT
Start: 2020-12-27 | End: 2020-12-27 | Stop reason: HOSPADM

## 2020-12-27 RX ORDER — HYDROMORPHONE HYDROCHLORIDE 2 MG/ML
INJECTION, SOLUTION INTRAMUSCULAR; INTRAVENOUS; SUBCUTANEOUS AS NEEDED
Status: DISCONTINUED | OUTPATIENT
Start: 2020-12-27 | End: 2020-12-27 | Stop reason: HOSPADM

## 2020-12-27 RX ORDER — SODIUM CHLORIDE, SODIUM LACTATE, POTASSIUM CHLORIDE, CALCIUM CHLORIDE 600; 310; 30; 20 MG/100ML; MG/100ML; MG/100ML; MG/100ML
INJECTION, SOLUTION INTRAVENOUS
Status: DISCONTINUED | OUTPATIENT
Start: 2020-12-27 | End: 2020-12-27 | Stop reason: HOSPADM

## 2020-12-27 RX ORDER — OXYCODONE AND ACETAMINOPHEN 5; 325 MG/1; MG/1
1-2 TABLET ORAL
Qty: 18 TAB | Refills: 0 | Status: SHIPPED | OUTPATIENT
Start: 2020-12-27 | End: 2020-12-30

## 2020-12-27 RX ORDER — ROCURONIUM BROMIDE 10 MG/ML
INJECTION, SOLUTION INTRAVENOUS AS NEEDED
Status: DISCONTINUED | OUTPATIENT
Start: 2020-12-27 | End: 2020-12-27 | Stop reason: HOSPADM

## 2020-12-27 RX ORDER — CEFAZOLIN SODIUM 1 G/3ML
INJECTION, POWDER, FOR SOLUTION INTRAMUSCULAR; INTRAVENOUS AS NEEDED
Status: DISCONTINUED | OUTPATIENT
Start: 2020-12-27 | End: 2020-12-27 | Stop reason: HOSPADM

## 2020-12-27 RX ORDER — MIDAZOLAM HYDROCHLORIDE 1 MG/ML
INJECTION, SOLUTION INTRAMUSCULAR; INTRAVENOUS AS NEEDED
Status: DISCONTINUED | OUTPATIENT
Start: 2020-12-27 | End: 2020-12-27 | Stop reason: HOSPADM

## 2020-12-27 RX ORDER — DIPHENHYDRAMINE HYDROCHLORIDE 50 MG/ML
12.5 INJECTION, SOLUTION INTRAMUSCULAR; INTRAVENOUS AS NEEDED
Status: DISCONTINUED | OUTPATIENT
Start: 2020-12-27 | End: 2020-12-27 | Stop reason: HOSPADM

## 2020-12-27 RX ORDER — ONDANSETRON 2 MG/ML
INJECTION INTRAMUSCULAR; INTRAVENOUS AS NEEDED
Status: DISCONTINUED | OUTPATIENT
Start: 2020-12-27 | End: 2020-12-27 | Stop reason: HOSPADM

## 2020-12-27 RX ORDER — DEXTROSE, SODIUM CHLORIDE, SODIUM LACTATE, POTASSIUM CHLORIDE, AND CALCIUM CHLORIDE 5; .6; .31; .03; .02 G/100ML; G/100ML; G/100ML; G/100ML; G/100ML
125 INJECTION, SOLUTION INTRAVENOUS CONTINUOUS
Status: DISCONTINUED | OUTPATIENT
Start: 2020-12-27 | End: 2020-12-27 | Stop reason: HOSPADM

## 2020-12-27 RX ORDER — ONDANSETRON 2 MG/ML
4 INJECTION INTRAMUSCULAR; INTRAVENOUS
Status: DISCONTINUED | OUTPATIENT
Start: 2020-12-27 | End: 2020-12-27 | Stop reason: HOSPADM

## 2020-12-27 RX ORDER — SODIUM CHLORIDE 0.9 % (FLUSH) 0.9 %
5-40 SYRINGE (ML) INJECTION AS NEEDED
Status: DISCONTINUED | OUTPATIENT
Start: 2020-12-27 | End: 2020-12-27 | Stop reason: HOSPADM

## 2020-12-27 RX ORDER — LIDOCAINE HYDROCHLORIDE 10 MG/ML
0.5 INJECTION, SOLUTION EPIDURAL; INFILTRATION; INTRACAUDAL; PERINEURAL AS NEEDED
Status: DISCONTINUED | OUTPATIENT
Start: 2020-12-27 | End: 2020-12-27 | Stop reason: HOSPADM

## 2020-12-27 RX ORDER — SCOLOPAMINE TRANSDERMAL SYSTEM 1 MG/1
PATCH, EXTENDED RELEASE TRANSDERMAL AS NEEDED
Status: DISCONTINUED | OUTPATIENT
Start: 2020-12-27 | End: 2020-12-27 | Stop reason: HOSPADM

## 2020-12-27 RX ORDER — MIDAZOLAM HYDROCHLORIDE 1 MG/ML
1 INJECTION, SOLUTION INTRAMUSCULAR; INTRAVENOUS
Status: DISCONTINUED | OUTPATIENT
Start: 2020-12-27 | End: 2020-12-27 | Stop reason: HOSPADM

## 2020-12-27 RX ORDER — MORPHINE SULFATE 10 MG/ML
2 INJECTION, SOLUTION INTRAMUSCULAR; INTRAVENOUS
Status: DISCONTINUED | OUTPATIENT
Start: 2020-12-27 | End: 2020-12-27 | Stop reason: HOSPADM

## 2020-12-27 RX ORDER — SUCCINYLCHOLINE CHLORIDE 20 MG/ML
INJECTION INTRAMUSCULAR; INTRAVENOUS AS NEEDED
Status: DISCONTINUED | OUTPATIENT
Start: 2020-12-27 | End: 2020-12-27 | Stop reason: HOSPADM

## 2020-12-27 RX ORDER — BUPIVACAINE HYDROCHLORIDE AND EPINEPHRINE 5; 5 MG/ML; UG/ML
INJECTION, SOLUTION EPIDURAL; INTRACAUDAL; PERINEURAL AS NEEDED
Status: DISCONTINUED | OUTPATIENT
Start: 2020-12-27 | End: 2020-12-27 | Stop reason: HOSPADM

## 2020-12-27 RX ADMIN — PROPOFOL 150 MG: 10 INJECTION, EMULSION INTRAVENOUS at 14:19

## 2020-12-27 RX ADMIN — FENTANYL CITRATE 50 MCG: 50 INJECTION, SOLUTION INTRAMUSCULAR; INTRAVENOUS at 14:19

## 2020-12-27 RX ADMIN — ROCURONIUM BROMIDE 10 MG: 10 SOLUTION INTRAVENOUS at 14:19

## 2020-12-27 RX ADMIN — SODIUM CHLORIDE 1000 ML: 9 INJECTION, SOLUTION INTRAVENOUS at 10:03

## 2020-12-27 RX ADMIN — FENTANYL CITRATE 25 MCG: 50 INJECTION, SOLUTION INTRAMUSCULAR; INTRAVENOUS at 16:17

## 2020-12-27 RX ADMIN — LIDOCAINE HYDROCHLORIDE 60 MG: 20 INJECTION, SOLUTION EPIDURAL; INFILTRATION; INTRACAUDAL; PERINEURAL at 14:19

## 2020-12-27 RX ADMIN — ROCURONIUM BROMIDE 25 MG: 10 SOLUTION INTRAVENOUS at 14:22

## 2020-12-27 RX ADMIN — FENTANYL CITRATE 50 MCG: 50 INJECTION, SOLUTION INTRAMUSCULAR; INTRAVENOUS at 14:34

## 2020-12-27 RX ADMIN — MORPHINE SULFATE 4 MG: 4 INJECTION INTRAVENOUS at 10:03

## 2020-12-27 RX ADMIN — HYDROMORPHONE HYDROCHLORIDE 1 MG: 1 INJECTION, SOLUTION INTRAMUSCULAR; INTRAVENOUS; SUBCUTANEOUS at 16:17

## 2020-12-27 RX ADMIN — SODIUM CHLORIDE, POTASSIUM CHLORIDE, SODIUM LACTATE AND CALCIUM CHLORIDE: 600; 310; 30; 20 INJECTION, SOLUTION INTRAVENOUS at 14:10

## 2020-12-27 RX ADMIN — MIDAZOLAM 2 MG: 1 INJECTION INTRAMUSCULAR; INTRAVENOUS at 14:10

## 2020-12-27 RX ADMIN — HYDROMORPHONE HYDROCHLORIDE 0.5 MG: 2 INJECTION, SOLUTION INTRAMUSCULAR; INTRAVENOUS; SUBCUTANEOUS at 14:52

## 2020-12-27 RX ADMIN — FENTANYL CITRATE 25 MCG: 50 INJECTION, SOLUTION INTRAMUSCULAR; INTRAVENOUS at 16:37

## 2020-12-27 RX ADMIN — FENTANYL CITRATE 25 MCG: 50 INJECTION, SOLUTION INTRAMUSCULAR; INTRAVENOUS at 16:49

## 2020-12-27 RX ADMIN — ONDANSETRON 8 MG: 2 INJECTION INTRAMUSCULAR; INTRAVENOUS at 10:03

## 2020-12-27 RX ADMIN — FENTANYL CITRATE 25 MCG: 50 INJECTION, SOLUTION INTRAMUSCULAR; INTRAVENOUS at 16:27

## 2020-12-27 RX ADMIN — SCOPALAMINE 1 PATCH: 1 PATCH, EXTENDED RELEASE TRANSDERMAL at 14:10

## 2020-12-27 RX ADMIN — KETAMINE HYDROCHLORIDE 25 MG: 10 INJECTION, SOLUTION INTRAMUSCULAR; INTRAVENOUS at 14:19

## 2020-12-27 RX ADMIN — MEPERIDINE HYDROCHLORIDE 12.5 MG: 25 INJECTION INTRAMUSCULAR; INTRAVENOUS; SUBCUTANEOUS at 16:36

## 2020-12-27 RX ADMIN — CEFAZOLIN 2 G: 330 INJECTION, POWDER, FOR SOLUTION INTRAMUSCULAR; INTRAVENOUS at 14:26

## 2020-12-27 RX ADMIN — DEXAMETHASONE SODIUM PHOSPHATE 8 MG: 4 INJECTION, SOLUTION INTRAMUSCULAR; INTRAVENOUS at 14:31

## 2020-12-27 RX ADMIN — SUCCINYLCHOLINE CHLORIDE 160 MG: 20 INJECTION, SOLUTION INTRAMUSCULAR; INTRAVENOUS at 14:19

## 2020-12-27 RX ADMIN — ONDANSETRON HYDROCHLORIDE 4 MG: 2 INJECTION, SOLUTION INTRAMUSCULAR; INTRAVENOUS at 14:31

## 2020-12-27 NOTE — H&P
General Surgery History and Physical     CC: Abd pain     History of Present Illness:      Sulma Lewis is a 48 y.o. female who presented with abdominal pain. Patient has a history of known gallstones and has been seen by Dr. Gomez Dubon. They are planning on an elective cholecystectomy in January. The patient presents after having acute pain overnight in the right upper quadrant. Pain is 9 out of 10. Some radiation to her right flank. Pain was sharp in nature pain medication helps. She had associated nausea and vomiting. Palpation of the area worsen the pain. Work-up showed gallstones without signs of pericholecystic fluid. She has had persistent pain. LFTs are chronically elevated. No signs of biliary obstruction. Hepatic steatosis noted on ultrasound.             Past Medical History:   Diagnosis Date    Anorexia      Bulimia      Depression      Endometriosis      High cholesterol      Kidney stone      Migraines      Ovarian cyst      Proteinuria       mild            Past Surgical History:   Procedure Laterality Date    HX GYN         endometrial oblation  11/03    HX MELLY AND BSO   02/2020            Family History   Problem Relation Age of Onset    Diabetes Father      Arthritis-rheumatoid Father      Diabetes Sister        Social History               Socioeconomic History    Marital status:        Spouse name: Not on file    Number of children: Not on file    Years of education: Not on file    Highest education level: Not on file   Tobacco Use    Smoking status: Never Smoker    Smokeless tobacco: Never Used                 Prior to Admission medications    Medication Sig Start Date End Date Taking? Authorizing Provider   dextroamphetamine-amphetamine (ADDERALL) 20 mg tablet Take 1 Tab by mouth two (2) times a day. 12/8/20     Ney Valencia MD   amLODIPine (NORVASC) 10 mg tablet Take 1 Tab by mouth daily.  6/23/20     Ney Valencia MD   estradioL (Estrace) 1 mg tablet Take 1 mg by mouth daily.       Provider, Historical   FLUoxetine (PROZAC) 20 mg capsule Take 1 Cap by mouth daily. Take 4 capsules QD 3/3/20     Marina Deleon MD   nebivolol (BYSTOLIC) 20 mg tablet Take 40 mg by mouth daily.       Provider, Historical   atorvastatin (LIPITOR) 20 mg tablet Take 1 Tab by mouth daily. 4/15/19     Marina Deleon MD   lisinopril-hydroCHLOROthiazide (PRINZIDE, ZESTORETIC) 20-12.5 mg per tablet Take 2 Tabs by mouth daily. 19     Marina Deleon MD   metFORMIN (GLUCOPHAGE) 500 mg tablet Take 1 Tab by mouth daily (with breakfast).  18     Marina Deleon MD   butalbital-acetaminophen-caffeine (FIORICET, [facility]) -40 mg per tablet Take 1-2 Tabs by mouth four (4) times daily as needed for Headache. 17     Marina Deleon MD           Allergies   Allergen Reactions    Sulfa (Sulfonamide Antibiotics) Unknown (comments)         Review of Systems:  Constitutional: No fever or chills  Neurologic: No headache  Eyes: No scleral icterus or irritated eyes  Nose: No nasal pain or drainage  Mouth: No oral lesions or sore throat  Cardiac: No palpations or chest pain  Pulmonary: No cough or shortness or breath  Gastrointestinal: Abdominal pain, nausea, emesis  Genitourinary: No dysuria  Musculoskeletal: No muscle or joint tenderness  Skin: No rashes or lesions  Psychiatric: No anxiety or depressed mood     Physical Exam:   Temp (24hrs), Av.5 °F (36.4 °C), Min:97.5 °F (36.4 °C), Max:97.5 °F (36.4 °C)        Visit Vitals  BP (!) 162/103 (BP 1 Location: Left arm, BP Patient Position: At rest;Sitting)   Pulse 86   Temp 97.5 °F (36.4 °C)   Resp 18   SpO2 98%      General: No acute distress, conversant  Eyes: PERRLA, no scleral icterus  HENT: Normocephalic without oral lesions  Neck: Trachea midline without LAD  Cardiac: Normal pulse rate and rhythm  Pulmonary: Symmetric chest rise with normal effort  GI: Soft, tenderness in the right upper quadrant without peritonitis  Skin: Warm without rash  Extremities: No edema or joint stiffness  Psych: Appropriate mood and affect     Assessment:      51-year-old female with symptomatic cholelithiasis with possible early acute cholecystitis     Plan:   Given her recurrent nature of her symptoms she like to undergo lap jolynn today. She had persistent pain and elevation in her LFTs. We will plan for lap jolynn today.     The reasons for surgery include: Symptomatic cholelithiasis     I explained the indications and benefits for laparoscopic cholecystectomy as well as the alternatives.      I discussed the potential risks, including but not limited to: bleeding, superficial and deep surgical wound infection, bowel injuries, vascular injuries, common bile duct injury DVT/PE, and complications with anesthesia including MI, stroke, and death.     I answered all questions without making any guarantees.  The patient indicates that they understand the risks and benefits and they would like to proceed.   Lv Merchant MD  Bariatric and General Surgeon  [facility]           Signed By: Salvador William MD  Bariatric and General Surgeon  [facility]     December 27, 2020

## 2020-12-27 NOTE — ED TRIAGE NOTES
Patient comes to the ER c/o \"gall bladder attack\". Supposed to have her gall bladder removed on 01/19 but has been up since 1am with abdominal pain.  +N/V

## 2020-12-27 NOTE — ANESTHESIA POSTPROCEDURE EVALUATION
Procedure(s):  CHOLECYSTECTOMY LAPAROSCOPIC. general    Anesthesia Post Evaluation        Patient location during evaluation: PACU  Patient participation: complete - patient participated  Level of consciousness: awake and alert  Pain management: adequate  Airway patency: patent  Anesthetic complications: no  Cardiovascular status: acceptable  Respiratory status: acceptable  Hydration status: acceptable  Comments: I have seen and evaluated the patient and is ready for discharge. Hesham Durand MD    Post anesthesia nausea and vomiting:  none      INITIAL Post-op Vital signs:   Vitals Value Taken Time   /78 12/27/20 1601   Temp 36.4 °C (97.5 °F) 12/27/20 1601   Pulse 83 12/27/20 1602   Resp 14 12/27/20 1602   SpO2 94 % 12/27/20 1602   Vitals shown include unvalidated device data.

## 2020-12-27 NOTE — ED NOTES
TRANSFER - OUT REPORT:    Verbal report given to PACU RN(name) on Chanel Like  being transferred to OR (unit) for ordered procedure       Report consisted of patients Situation, Background, Assessment and   Recommendations(SBAR). Information from the following report(s) SBAR, Kardex, ED Summary, [facility] and Recent Results was reviewed with the receiving nurse. Lines:   Peripheral IV 12/27/20 Left Antecubital (Active)   Site Assessment Clean, dry, & intact 12/27/20 0926   Phlebitis Assessment 0 12/27/20 0926   Infiltration Assessment 0 12/27/20 0926   Dressing Status Clean, dry, & intact 12/27/20 5780        Opportunity for questions and clarification was provided.       Patient transported with:   Tech      All valuables given to

## 2020-12-27 NOTE — PROGRESS NOTES
General Surgery History and Physical 
 
CC: Abd pain History of Present Illness:  
  
Tremayne Cai is a 48 y.o. female who presented with abdominal pain. Patient has a history of known gallstones and has been seen by Dr. Leslee Ramirez. They are planning on an elective cholecystectomy in January. The patient presents after having acute pain overnight in the right upper quadrant. Pain is 9 out of 10. Some radiation to her right flank. Pain was sharp in nature pain medication helps. She had associated nausea and vomiting. Palpation of the area worsen the pain. Work-up showed gallstones without signs of pericholecystic fluid. She has had persistent pain. LFTs are chronically elevated. No signs of biliary obstruction. Hepatic steatosis noted on ultrasound. Past Medical History:  
Diagnosis Date  Anorexia  Bulimia  Depression  Endometriosis  High cholesterol  Kidney stone  Migraines  Ovarian cyst   
 Proteinuria   
 mild Past Surgical History:  
Procedure Laterality Date  HX GYN    
 endometrial oblation  11/03  HX MELLY AND BSO  02/2020 Family History Problem Relation Age of Onset  Diabetes Father  Arthritis-rheumatoid Father  Diabetes Sister Social History Socioeconomic History  Marital status:  Spouse name: Not on file  Number of children: Not on file  Years of education: Not on file  Highest education level: Not on file Tobacco Use  Smoking status: Never Smoker  Smokeless tobacco: Never Used Prior to Admission medications Medication Sig Start Date End Date Taking? Authorizing Provider  
dextroamphetamine-amphetamine (ADDERALL) 20 mg tablet Take 1 Tab by mouth two (2) times a day. 12/8/20   Prisca Bray MD  
amLODIPine (NORVASC) 10 mg tablet Take 1 Tab by mouth daily.  6/23/20   Prisca Bray MD  
 estradioL (Estrace) 1 mg tablet Take 1 mg by mouth daily. Provider, Historical  
FLUoxetine (PROZAC) 20 mg capsule Take 1 Cap by mouth daily. Take 4 capsules QD 3/3/20   Yu Lezama MD  
nebivolol (BYSTOLIC) 20 mg tablet Take 40 mg by mouth daily. Provider, Historical  
atorvastatin (LIPITOR) 20 mg tablet Take 1 Tab by mouth daily. 4/15/19   Yu Lezama MD  
lisinopril-hydroCHLOROthiazide (PRINZIDE, ZESTORETIC) 20-12.5 mg per tablet Take 2 Tabs by mouth daily. 19   Yu Lezama MD  
metFORMIN (GLUCOPHAGE) 500 mg tablet Take 1 Tab by mouth daily (with breakfast). 18   Yu Lezama MD  
butalbital-acetaminophen-caffeine (FIORICET, [facility]) -40 mg per tablet Take 1-2 Tabs by mouth four (4) times daily as needed for Headache. 17   Yu Lezama MD  
 
Allergies Allergen Reactions  Sulfa (Sulfonamide Antibiotics) Unknown (comments) Review of Systems: 
Constitutional: No fever or chills Neurologic: No headache Eyes: No scleral icterus or irritated eyes Nose: No nasal pain or drainage Mouth: No oral lesions or sore throat Cardiac: No palpations or chest pain Pulmonary: No cough or shortness or breath Gastrointestinal: Abdominal pain, nausea, emesis Genitourinary: No dysuria Musculoskeletal: No muscle or joint tenderness Skin: No rashes or lesions Psychiatric: No anxiety or depressed mood Physical Exam:  
Temp (24hrs), Av.5 °F (36.4 °C), Min:97.5 °F (36.4 °C), Max:97.5 °F (36.4 °C) Visit Vitals BP (!) 162/103 (BP 1 Location: Left arm, BP Patient Position: At rest;Sitting) Pulse 86 Temp 97.5 °F (36.4 °C) Resp 18 SpO2 98% General: No acute distress, conversant Eyes: PERRLA, no scleral icterus HENT: Normocephalic without oral lesions Neck: Trachea midline without LAD Cardiac: Normal pulse rate and rhythm Pulmonary: Symmetric chest rise with normal effort GI: Soft, tenderness in the right upper quadrant without peritonitis Skin: Warm without rash Extremities: No edema or joint stiffness Psych: Appropriate mood and affect Assessment:  
 
55-year-old female with symptomatic cholelithiasis with possible early acute cholecystitis Plan:  
Given her recurrent nature of her symptoms she like to undergo lap jolynn today. She had persistent pain and elevation in her LFTs. We will plan for lap jolynn today. The reasons for surgery include: Symptomatic cholelithiasis I explained the indications and benefits for laparoscopic cholecystectomy as well as the alternatives. I discussed the potential risks, including but not limited to: bleeding, superficial and deep surgical wound infection, bowel injuries, vascular injuries, common bile duct injury DVT/PE, and complications with anesthesia including MI, stroke, and death. I answered all questions without making any guarantees. The patient indicates that they understand the risks and benefits and they would like to proceed. Michael Mejia MD 
Bariatric and General Surgeon [facility] Signed By: Michael Mejia MD 
Bariatric and General Surgeon [facility] December 27, 2020

## 2020-12-27 NOTE — OP NOTES
OPERATIVE NOTE    Date of Procedure: 12/27/2020     Preoperative Diagnosis: symptomatic cholelithiasis  Postoperative Diagnosis: acute cholecystitis, hepatic steatosis    Procedure: Procedure(s):  CHOLECYSTECTOMY LAPAROSCOPIC, liver biopsy    Surgeon: Filomena Jerez MD  Assistant(s):  - They were critically important in the exposure and key portions of the case  Anesthesia: General   Indications: Symptomatic cholelithiasis  Findings: Early acute cholecystitis, fatty liver    Description of Operation: Tremayne Cai was identified in the pre-operative holding area. Informed consent was obtained after a complete discussion of risks, benefits and alternatives to surgery were had with the patient. The patient was brought back to the operating room and placed under general endotracheal anesthesia in the supine position on the operating room table with a foot board. The patient was then prepped and draped in the usual sterile fashion. A timeout was performed. We then injected local anesthetic in the RUQ. We placed a 5 mm optiview trocar and safely entered the abdomen. A 12 mm trocar was then placed inferior to the umbilicus. Two additional trocars were placed under the right costal margin. The patient was placed in steep reverse Trendelenburg with the table tilted to the left. The dome of the gallbladder was grasped with an alligator grasper and retracted anteriorly and laterally over the liver edge. The infundibulum was grasped with a royce and retracted laterally. Using the hook cautery, the peritoneum of the gallbladder was incised around the infundibulum and lateral boarders. We identified the cystic duct and cystic artery and after further dissection obtained the critical view of safety. We then placed two 5mm clips proximally and one distally on the cystic artery. This was repeated in similar fashion for the cystic duct. Both structures were transected between the clips.  The gallbladder was then liberated from the gallbladder fossa and placed in an Endo-catch bag. There was some lateral gallbladder fossa hemorrhage that was controlled with clips. Hemostasis was confirmed and we ensured no spilled stones. We irrigated the abdomen. We then performed a Phuc Cut biopsy of the liver and controlled the site with cautery. Next, we closed the 12 mm periumbilical trocar site with 0 PDS on a trans-fascial suture passer. The specimen was removed via the umbilical trocar site. We replaced the 12 mm trocar and after confirming hemostasis and clip location one last time, we removed the three 5mm trocars under direct visualization to ensure no hemorrhage. We then released the pneumoperitoneum and removed the 12 mm trocar. The PDS suture was tied down. The dermis was approximated with 4-0 Monocryl suture followed by Dermabond for the skin. At the end of the procedure all instrument, needle, and sponge counts were correct. I was present and scrubbed throughout the entirety of the case. The patient awoke from anesthesia and was extubated without complication and sent to PACU in stable condition.       Estimated Blood Loss: 10cc    Specimens:   ID Type Source Tests Collected by Time Destination   1 : Gallbladder and contents Fresh Gallbladder  Mariah Roy MD 12/27/2020 1443 Pathology   2 : Liver Biopsy Fresh Liver  Mariah Roy MD 12/27/2020 1509 Pathology        Complications: None      Filomena Jerez MD  Bariatric and General Surgeon  [facility]  12/27/2020

## 2020-12-27 NOTE — ED PROVIDER NOTES
This is a 51-year-old female with a past medical history including depression, endometriosis, high cholesterol, nephrolithiasis, and ovarian cysts who presents the ER today for the evaluation of abdominal pain and distention. According to the patient, she has had intermittent biliary colic since September of this year. She currently has an outpatient cholecystectomy scheduled for late January 2021 with Dr. Ambreen Thomas, but states that she developed relatively sudden onset of severe right upper quadrant and epigastric pain this morning around 1 AM that is also been associated with nausea and vomiting. The pain also seems to radiate toward her right flank. she states that her symptoms are consistent with her prior biliary colic, but states that this seems even more intense.     She denies chest pain, shortness of breath, cough, fever/chills, urinary complaints           Past Medical History:   Diagnosis Date    Anorexia     Bulimia     Depression     Endometriosis     High cholesterol     Kidney stone     Migraines     Ovarian cyst     Proteinuria     mild       Past Surgical History:   Procedure Laterality Date    HX GYN      endometrial oblation  11/03    HX MELLY AND BSO  02/2020         Family History:   Problem Relation Age of Onset    Diabetes Father     Arthritis-rheumatoid Father     Diabetes Sister        Social History     Socioeconomic History    Marital status:      Spouse name: Not on file    Number of children: Not on file    Years of education: Not on file    Highest education level: Not on file   Occupational History    Not on file   Social Needs    Financial resource strain: Not on file    Food insecurity     Worry: Not on file     Inability: Not on file    Transportation needs     Medical: Not on file     Non-medical: Not on file   Tobacco Use    Smoking status: Never Smoker    Smokeless tobacco: Never Used   Substance and Sexual Activity    Alcohol use: Not on file    Drug use: Not on file    Sexual activity: Not on file   Lifestyle    Physical activity     Days per week: Not on file     Minutes per session: Not on file    Stress: Not on file   Relationships    Social connections     Talks on phone: Not on file     Gets together: Not on file     Attends Mandaen service: Not on file     Active member of club or organization: Not on file     Attends meetings of clubs or organizations: Not on file     Relationship status: Not on file    Intimate partner violence     Fear of current or ex partner: Not on file     Emotionally abused: Not on file     Physically abused: Not on file     Forced sexual activity: Not on file   Other Topics Concern    Not on file   Social History Narrative    Not on file         ALLERGIES: Sulfa (sulfonamide antibiotics)    Review of Systems   Constitutional: Negative for chills and fever. HENT: Negative for sore throat. Eyes: Negative for visual disturbance. Respiratory: Negative for shortness of breath. Cardiovascular: Negative for palpitations. Gastrointestinal: Positive for abdominal distention, abdominal pain, nausea and vomiting. Genitourinary: Positive for flank pain. Negative for dysuria. Musculoskeletal: Negative for myalgias. Skin: Negative for rash. Neurological: Negative for dizziness. Psychiatric/Behavioral: Negative for dysphoric mood. Vitals:    12/27/20 0919   BP: (!) 162/103   Pulse: 86   Resp: 18   Temp: 97.5 °F (36.4 °C)   SpO2: 98%            Physical Exam  Vitals signs and nursing note reviewed. Constitutional:       General: She is in acute distress. Appearance: Normal appearance. She is not ill-appearing or diaphoretic. HENT:      Head: Normocephalic and atraumatic. Right Ear: External ear normal.      Left Ear: External ear normal.      Nose: Nose normal.      Mouth/Throat:      Mouth: Mucous membranes are moist.      Pharynx: Oropharynx is clear.    Eyes:      General: No scleral icterus. Extraocular Movements: Extraocular movements intact. Conjunctiva/sclera: Conjunctivae normal.      Pupils: Pupils are equal, round, and reactive to light. Neck:      Musculoskeletal: Normal range of motion and neck supple. No neck rigidity or muscular tenderness. Cardiovascular:      Rate and Rhythm: Normal rate and regular rhythm. Pulses: Normal pulses. Heart sounds: Normal heart sounds. Pulmonary:      Effort: Pulmonary effort is normal.      Breath sounds: Normal breath sounds. Abdominal:      General: Abdomen is flat. Bowel sounds are decreased. There is no distension. Palpations: Abdomen is soft. Tenderness: There is abdominal tenderness. There is guarding. There is no right CVA tenderness, left CVA tenderness or rebound. Positive signs include Wall's sign. Musculoskeletal: Normal range of motion. Skin:     General: Skin is warm and dry. Coloration: Skin is not pale. Neurological:      General: No focal deficit present. Mental Status: She is alert and oriented to person, place, and time. Psychiatric:         Mood and Affect: Mood normal.         Behavior: Behavior normal.         Thought Content:  Thought content normal.         Judgment: Judgment normal.          MDM      VITAL SIGNS:  Patient Vitals for the past 4 hrs:   Temp Pulse Resp BP SpO2   12/27/20 0919 97.5 °F (36.4 °C) 86 18 (!) 162/103 98 %         LABS:  Recent Results (from the past 6 hour(s))   CBC WITH AUTOMATED DIFF    Collection Time: 12/27/20  9:24 AM   Result Value Ref Range    WBC 5.6 3.6 - 11.0 K/uL    RBC 4.58 3.80 - 5.20 M/uL    HGB 13.7 11.5 - 16.0 g/dL    HCT 41.6 35.0 - 47.0 %    MCV 90.8 80.0 - 99.0 FL    MCH 29.9 26.0 - 34.0 PG    MCHC 32.9 30.0 - 36.5 g/dL    RDW 11.9 11.5 - 14.5 %    PLATELET 871 100 - 360 K/uL    MPV 11.1 8.9 - 12.9 FL    NRBC 0.0 0  WBC    ABSOLUTE NRBC 0.00 0.00 - 0.01 K/uL    NEUTROPHILS 75 32 - 75 %    LYMPHOCYTES 19 12 - 49 % MONOCYTES 5 5 - 13 %    EOSINOPHILS 0 0 - 7 %    BASOPHILS 1 0 - 1 %    IMMATURE GRANULOCYTES 0 0.0 - 0.5 %    ABS. NEUTROPHILS 4.1 1.8 - 8.0 K/UL    ABS. LYMPHOCYTES 1.1 0.8 - 3.5 K/UL    ABS. MONOCYTES 0.3 0.0 - 1.0 K/UL    ABS. EOSINOPHILS 0.0 0.0 - 0.4 K/UL    ABS. BASOPHILS 0.0 0.0 - 0.1 K/UL    ABS. IMM. GRANS. 0.0 0.00 - 0.04 K/UL    DF AUTOMATED     METABOLIC PANEL, COMPREHENSIVE    Collection Time: 12/27/20  9:24 AM   Result Value Ref Range    Sodium 137 136 - 145 mmol/L    Potassium 3.9 3.5 - 5.1 mmol/L    Chloride 106 97 - 108 mmol/L    CO2 28 21 - 32 mmol/L    Anion gap 3 (L) 5 - 15 mmol/L    Glucose 154 (H) 65 - 100 mg/dL    BUN 11 6 - 20 MG/DL    Creatinine 0.69 0.55 - 1.02 MG/DL    BUN/Creatinine ratio 16 12 - 20      GFR est AA >60 >60 ml/min/1.73m2    GFR est non-AA >60 >60 ml/min/1.73m2    Calcium 8.8 8.5 - 10.1 MG/DL    Bilirubin, total 0.7 0.2 - 1.0 MG/DL    ALT (SGPT) 218 (H) 12 - 78 U/L    AST (SGOT) 284 (H) 15 - 37 U/L    Alk. phosphatase 121 (H) 45 - 117 U/L    Protein, total 7.8 6.4 - 8.2 g/dL    Albumin 3.7 3.5 - 5.0 g/dL    Globulin 4.1 (H) 2.0 - 4.0 g/dL    A-G Ratio 0.9 (L) 1.1 - 2.2     SAMPLES BEING HELD    Collection Time: 12/27/20  9:24 AM   Result Value Ref Range    SAMPLES BEING HELD 1RED 1BLU     COMMENT        Add-on orders for these samples will be processed based on acceptable specimen integrity and analyte stability, which may vary by analyte.    LIPASE    Collection Time: 12/27/20  9:24 AM   Result Value Ref Range    Lipase 172 73 - 393 U/L   URINALYSIS W/MICROSCOPIC    Collection Time: 12/27/20 10:03 AM   Result Value Ref Range    Color YELLOW/STRAW      Appearance CLEAR CLEAR      Specific gravity 1.023 1.003 - 1.030      pH (UA) 7.5 5.0 - 8.0      Protein Negative NEG mg/dL    Glucose 100 (A) NEG mg/dL    Ketone Negative NEG mg/dL    Bilirubin Negative NEG      Blood Negative NEG      Urobilinogen 1.0 0.2 - 1.0 EU/dL    Nitrites Negative NEG      Leukocyte Esterase Negative NEG      WBC 0-4 0 - 4 /hpf    RBC 0-5 0 - 5 /hpf    Epithelial cells FEW FEW /lpf    Bacteria Negative NEG /hpf    Mucus TRACE (A) NEG /lpf   URINE CULTURE HOLD SAMPLE    Collection Time: 12/27/20 10:03 AM    Specimen: Serum; Urine   Result Value Ref Range    Urine culture hold        Urine on hold in Microbiology dept for 2 days. If unpreserved urine is submitted, it cannot be used for addtional testing after 24 hours, recollection will be required. IMAGING:  US ABD LTD   Final Result   IMPRESSION:       Normal macroscopic appearance of the gallbladder. Hepatic steatosis               Medications During Visit:  Medications   ondansetron (ZOFRAN) injection 8 mg (8 mg IntraVENous Given 12/27/20 1003)   sodium chloride 0.9 % bolus infusion 1,000 mL (1,000 mL IntraVENous New Bag 12/27/20 1003)   morphine injection 4 mg (4 mg IntraVENous Given 12/27/20 1003)         DECISION MAKING:  Rain Castro is a 48 y.o. female who comes in as above. Normal ER work-up, pain improved significantly after one-time dose of morphine. Discussed case with patient's PCP and general surgery. Plan is to admit and operate. Surgery to see this afternoon. Patient kept n.p.o. at this time. Vital signs are stable. Patient well-appearing. IMPRESSION:  1.  Biliary colic        DISPOSITION:  Admitted

## 2020-12-27 NOTE — DISCHARGE INSTRUCTIONS
Discharge Instructions for General Surgery Patients       1. Do not lift any objects weighing more than 10 pounds for 2 weeks. 2. Do not do any housework including vacuuming, scrubbing or yardwork for 4 weeks. 3. Do not drive or operate machinery while taking sedating or narcotic medications. 4. Post operative pain is expected. Try to wean off narcotics as soon as able and take tylenol or NSAIDS. Do not take tylenol with Norco or Percocet as this may harm your liver. No NSAIDS if you are bariatric surgery patient. 5. You may walk as desired and go up and down stairs as needed. Walking is encouraged. 6. You may shower Monday evening. Do not take tub baths, swim or use hot tubs for 2 weeks. Pat dry wounds after with a towel. 7. Leave glue on wounds. It will fall off with time. Do not scrub around incisions. If redness develops around the glue okay to peel off in hot shower. 8. Regular diet. Take Miralax once or twice a day as needed for constipation. 9. Follow up with provider as scheduled. 10. If you experience fever (greater than 101.5), chills, vomiting or redness or drainage at surgical site, please contact your surgeons office. If you have further questions or concerns, please call your surgeons office at [phone].

## 2020-12-27 NOTE — PERIOP NOTES
Patient  Jayce See called and updated on surgery progress via cell phone number [phone]. Currently waiting in surgical waiting area.

## 2020-12-29 ENCOUNTER — OFFICE VISIT (OUTPATIENT)
Dept: SURGERY | Age: 50
End: 2020-12-29
Payer: COMMERCIAL

## 2020-12-29 ENCOUNTER — TELEPHONE (OUTPATIENT)
Dept: SURGERY | Age: 50
End: 2020-12-29

## 2020-12-29 DIAGNOSIS — K80.00 ACUTE CHOLECYSTITIS DUE TO BILIARY CALCULUS: ICD-10-CM

## 2020-12-29 DIAGNOSIS — Z76.89 ENCOUNTER FOR CHOLECYSTECTOMY: Primary | ICD-10-CM

## 2020-12-29 PROCEDURE — 99024 POSTOP FOLLOW-UP VISIT: CPT | Performed by: SURGERY

## 2020-12-29 RX ORDER — HYDROMORPHONE HYDROCHLORIDE 2 MG/1
2-4 TABLET ORAL
Qty: 15 TAB | Refills: 0 | Status: SHIPPED | OUTPATIENT
Start: 2020-12-29 | End: 2021-01-01

## 2020-12-29 RX ORDER — CYCLOBENZAPRINE HCL 10 MG
10 TABLET ORAL
Qty: 20 TAB | Refills: 0 | Status: SHIPPED | OUTPATIENT
Start: 2020-12-29 | End: 2021-11-08

## 2020-12-29 NOTE — TELEPHONE ENCOUNTER
Patient stated that she is experiencing pain and that her navel is really red and hard when she presses on it. She would like a call back from the nurse.

## 2020-12-29 NOTE — TELEPHONE ENCOUNTER
Spoke with patient who states her belly button is dark red and heat is coming out of it. The pain medication is not work. All other incisions is okay. Patient will send a picture in [employer]. Informed patient I will speak with Dr. Justus Taylor and return call.

## 2020-12-29 NOTE — PROGRESS NOTES
And called the office today concerning of a lump in her periumbilical area. Also complaining of redness and heat. We performed a video call. This appears to be a hematoma from surgery. Of note, there was no spillage of bile from this extraction site and was an extremely clean procedure. No signs of active infection. Patient is anxious regarding this wants to be seen in person. I will see her later on today around 3:00.     Donald Castellon MD  Bariatric and General Surgeon  [facility]

## 2020-12-29 NOTE — PROGRESS NOTES
Surgery Progress Note    12/29/2020    CC: Post operative pain    Subjective:     Patient called this morning regarding periumbilical pain and redness. We had a video conference call which demonstrated what appeared to be a hematoma without infection. Because of her persistent pain and anxiety she wanted to come to have this evaluated. She reports persistent pain around the umbilicus and some pain in the left lower quadrant. No fevers or chills. The Percocet is not touching the pain. She is worried about an infection at this time. Constitutional: No fever or chills  Neurologic: No headache  Eyes: No scleral icterus or irritated eyes  Nose: No nasal pain or drainage  Mouth: No oral lesions or sore throat  Cardiac: No palpations or chest pain  Pulmonary: No cough or shortness or breath  Gastrointestinal: Periumbilical pain  Genitourinary: No dysuria  Musculoskeletal: No muscle or joint tenderness  Skin: No rashes or lesions  Psychiatric: No anxiety or depressed mood    Objective: There were no vitals taken for this visit. General: No acute distress, conversant  Eyes: PERRLA, no scleral icterus  HENT: Normocephalic without oral lesions  Neck: Trachea midline without LAD  Cardiac: Normal pulse rate and rhythm  Pulmonary: Symmetric chest rise with normal effort  GI: Soft, appropriately tender, 5 x 3 cm cutaneous hematoma with no signs of underlying deep hematoma. No signs of infection or erythema  Skin: Warm without rash  Extremities: No edema or joint stiffness  Psych: Appropriate mood and affect    Assessment:     51-year-old female status post lap jolynn with concerns for postoperative wound complication    Plan:     I reassured the patient that the wound does not look infected and this is likely a postoperative hematoma that will respond with out intervention. Given her anxiety she elected to have the wound opened. Using local anesthesia I anesthetized the skin around the wound.   I opened the incision. There was some thin blood but no large hematoma to evacuate as suspected. No purulence. I packed the wound wet-to-dry and gave instructions regarding wound care. I reassured the patient that this is likely pain from her fascial stitch from the 12 mm trocar. No antibiotic recommended. She felt much better after the intervention she said and thinks the local anesthesia fixed the pain. I ordered Flexeril for muscle spasm and also increase her pain medication for 15 doses of Dilaudid. I also recommended NSAIDs to alternate with her pain medication. I am hopeful she will recover soon from this pain. I told her to contact us with any further concerns. Otherwise I will see her at her 2-week postop visit. Total time involved with this patient's care was: 15 minutes, of which >50% of the time was spent counciling the patient.     Patrick Clinton MD  Bariatric and General Surgeon  [address] [place] [address] [facility]

## 2021-01-01 ENCOUNTER — HOSPITAL ENCOUNTER (EMERGENCY)
Age: 51
Discharge: HOME | End: 2021-01-01
Attending: EMERGENCY MEDICINE | Admitting: EMERGENCY MEDICINE
Payer: COMMERCIAL

## 2021-01-01 ENCOUNTER — APPOINTMENT (OUTPATIENT)
Dept: ULTRASOUND IMAGING | Age: 51
End: 2021-01-01
Attending: EMERGENCY MEDICINE
Payer: COMMERCIAL

## 2021-01-01 VITALS
OXYGEN SATURATION: 97 % | HEART RATE: 102 BPM | DIASTOLIC BLOOD PRESSURE: 81 MMHG | RESPIRATION RATE: 18 BRPM | SYSTOLIC BLOOD PRESSURE: 149 MMHG | TEMPERATURE: 96.9 F

## 2021-01-01 DIAGNOSIS — G89.18 POST-OPERATIVE PAIN: Primary | ICD-10-CM

## 2021-01-01 LAB
ALBUMIN SERPL-MCNC: 3.3 G/DL (ref 3.5–5)
ALBUMIN/GLOB SERPL: 0.8 {RATIO} (ref 1.1–2.2)
ALP SERPL-CCNC: 93 U/L (ref 45–117)
ALT SERPL-CCNC: 65 U/L (ref 12–78)
ANION GAP SERPL CALC-SCNC: 2 MMOL/L (ref 5–15)
AST SERPL-CCNC: 15 U/L (ref 15–37)
BASOPHILS # BLD: 0.1 K/UL (ref 0–0.1)
BASOPHILS NFR BLD: 1 % (ref 0–1)
BILIRUB SERPL-MCNC: 0.3 MG/DL (ref 0.2–1)
BUN SERPL-MCNC: 13 MG/DL (ref 6–20)
BUN/CREAT SERPL: 20 (ref 12–20)
CALCIUM SERPL-MCNC: 8.7 MG/DL (ref 8.5–10.1)
CHLORIDE SERPL-SCNC: 103 MMOL/L (ref 97–108)
CO2 SERPL-SCNC: 32 MMOL/L (ref 21–32)
CREAT SERPL-MCNC: 0.66 MG/DL (ref 0.55–1.02)
DIFFERENTIAL METHOD BLD: ABNORMAL
EOSINOPHIL # BLD: 0.6 K/UL (ref 0–0.4)
EOSINOPHIL NFR BLD: 9 % (ref 0–7)
ERYTHROCYTE [DISTWIDTH] IN BLOOD BY AUTOMATED COUNT: 12.2 % (ref 11.5–14.5)
GLOBULIN SER CALC-MCNC: 3.9 G/DL (ref 2–4)
GLUCOSE SERPL-MCNC: 143 MG/DL (ref 65–100)
HCT VFR BLD AUTO: 38.9 % (ref 35–47)
HGB BLD-MCNC: 12.6 G/DL (ref 11.5–16)
IMM GRANULOCYTES # BLD AUTO: 0 K/UL (ref 0–0.04)
IMM GRANULOCYTES NFR BLD AUTO: 1 % (ref 0–0.5)
LIPASE SERPL-CCNC: 106 U/L (ref 73–393)
LYMPHOCYTES # BLD: 2 K/UL (ref 0.8–3.5)
LYMPHOCYTES NFR BLD: 30 % (ref 12–49)
MCH RBC QN AUTO: 29.5 PG (ref 26–34)
MCHC RBC AUTO-ENTMCNC: 32.4 G/DL (ref 30–36.5)
MCV RBC AUTO: 91.1 FL (ref 80–99)
MONOCYTES # BLD: 0.5 K/UL (ref 0–1)
MONOCYTES NFR BLD: 7 % (ref 5–13)
NEUTS SEG # BLD: 3.6 K/UL (ref 1.8–8)
NEUTS SEG NFR BLD: 52 % (ref 32–75)
NRBC # BLD: 0 K/UL (ref 0–0.01)
NRBC BLD-RTO: 0 PER 100 WBC
PLATELET # BLD AUTO: 288 K/UL (ref 150–400)
PMV BLD AUTO: 10.5 FL (ref 8.9–12.9)
POTASSIUM SERPL-SCNC: 3.9 MMOL/L (ref 3.5–5.1)
PROT SERPL-MCNC: 7.2 G/DL (ref 6.4–8.2)
RBC # BLD AUTO: 4.27 M/UL (ref 3.8–5.2)
SODIUM SERPL-SCNC: 137 MMOL/L (ref 136–145)
WBC # BLD AUTO: 6.8 K/UL (ref 3.6–11)

## 2021-01-01 PROCEDURE — 80053 COMPREHEN METABOLIC PANEL: CPT

## 2021-01-01 PROCEDURE — 76705 ECHO EXAM OF ABDOMEN: CPT

## 2021-01-01 PROCEDURE — 36415 COLL VENOUS BLD VENIPUNCTURE: CPT

## 2021-01-01 PROCEDURE — 99024 POSTOP FOLLOW-UP VISIT: CPT | Performed by: SURGERY

## 2021-01-01 PROCEDURE — 85025 COMPLETE CBC W/AUTO DIFF WBC: CPT

## 2021-01-01 PROCEDURE — 99283 EMERGENCY DEPT VISIT LOW MDM: CPT

## 2021-01-01 PROCEDURE — 83690 ASSAY OF LIPASE: CPT

## 2021-01-01 NOTE — ED PROVIDER NOTES
Patient is a 60-year-old female with history of depression, diabetes, hyperlipidemia, kidney stones, presenting emergency department for evaluation of postoperative pain. Patient reports that she had a laparoscopic cholecystectomy done on December 27 and since been having experiencing worsening abdominal pain and bloating. She was seen in the office 2 days ago for routine follow-up. Patient has been treating pain with Flexeril and Dilaudid without relief in symptoms. Patient spoke with the on-call surgeon today who told her either to come to the office on Monday or to come to the emergency department this weekend. She denies fever, chills, sweats, vomiting, diarrhea, or any other medical complaints at this time. Please note that this dictation was completed with Inspire, the computer voice recognition software.  Quite often unanticipated grammatical, syntax, homophones, and other interpretive errors are inadvertently transcribed by the computer software.  Please disregard these errors.  Please excuse any errors that have escaped final proofreading.              Past Medical History:   Diagnosis Date    Anorexia     Bulimia     Depression     Endometriosis     High cholesterol     Kidney stone     Migraines     Ovarian cyst     Proteinuria     mild       Past Surgical History:   Procedure Laterality Date    HX CHOLECYSTECTOMY  2020    HX GYN      endometrial oblation  11/03    HX MELLY AND BSO  02/2020         Family History:   Problem Relation Age of Onset    Diabetes Father     Arthritis-rheumatoid Father     Diabetes Sister        Social History     Socioeconomic History    Marital status:      Spouse name: Not on file    Number of children: Not on file    Years of education: Not on file    Highest education level: Not on file   Occupational History    Not on file   Social Needs    Financial resource strain: Not on file    Food insecurity     Worry: Not on file     Inability: Not on file   [employer] needs     Medical: Not on file     Non-medical: Not on file   Tobacco Use    Smoking status: Never Smoker    Smokeless tobacco: Never Used   Substance and Sexual Activity    Alcohol use: Not on file    Drug use: Not on file    Sexual activity: Not on file   Lifestyle    Physical activity     Days per week: Not on file     Minutes per session: Not on file    Stress: Not on file   Relationships    Social connections     Talks on phone: Not on file     Gets together: Not on file     Attends Caodaism service: Not on file     Active member of club or organization: Not on file     Attends meetings of clubs or organizations: Not on file     Relationship status: Not on file    Intimate partner violence     Fear of current or ex partner: Not on file     Emotionally abused: Not on file     Physically abused: Not on file     Forced sexual activity: Not on file   Other Topics Concern    Not on file   Social History Narrative    Not on file         ALLERGIES: Sulfa (sulfonamide antibiotics)    Review of Systems   Constitutional: Negative for chills and fever. HENT: Negative for ear pain and sore throat. Eyes: Negative for visual disturbance. Respiratory: Negative for cough and shortness of breath. Cardiovascular: Negative for chest pain. Gastrointestinal: Positive for abdominal pain. Negative for diarrhea, nausea and vomiting. Genitourinary: Negative for flank pain. Musculoskeletal: Negative for back pain. Skin: Negative for color change. Neurological: Negative for dizziness and headaches. Psychiatric/Behavioral: Negative for confusion. Vitals:    01/01/21 1107   BP: (!) 149/81   Pulse: (!) 102   Resp: 18   Temp: 96.9 °F (36.1 °C)   SpO2: 97%            Physical Exam  Vitals signs and nursing note reviewed. Constitutional:       General: She is not in acute distress. Appearance: Normal appearance. She is not ill-appearing.    HENT:      Head: Normocephalic and atraumatic. Mouth/Throat:      Pharynx: Oropharynx is clear. Eyes:      Extraocular Movements: Extraocular movements intact. Conjunctiva/sclera: Conjunctivae normal.   Neck:      Musculoskeletal: No neck rigidity. Cardiovascular:      Rate and Rhythm: Normal rate and regular rhythm. Pulmonary:      Effort: Pulmonary effort is normal.      Breath sounds: Normal breath sounds. Abdominal:      Palpations: Abdomen is soft. Tenderness: There is abdominal tenderness in the right upper quadrant. Musculoskeletal: Normal range of motion. Skin:     General: Skin is warm and dry. Neurological:      General: No focal deficit present. Mental Status: She is alert and oriented to person, place, and time. Psychiatric:         Mood and Affect: Mood normal.          MDM  Number of Diagnoses or Management Options  Diagnosis management comments: Patient is alert, well-appearing, afebrile, vitals stable. Postop from laparoscopic cholecystectomy 12/27 by Dr. Jenn Moran, per chart review she was seen in the office 12/29 and had an in office wound exploration to evaluate for hematoma versus wound infection without any findings. Patient has had continued pain. Her abdomen is soft with tenderness in the right upper quadrant region. Well-healed surgical sites without evidence of infection at this time. Lab work unremarkable. Abdominal ultrasound without signs of abscess or other acute postoperative complication. CONSULT NOTE:  2:02 PM ALLI Kenny spoke with Dr. Kurt Henry, Consult for Surgery. Discussed available diagnostic tests and clinical findings. He is in agreement with care plans as outlined. Dr. Kurt Henry has personally evaluated the patient in ED and recommends discharge and outpatient follow-up. No further ED work-up advised at this time. Patient is in agreement with this plan.          Amount and/or Complexity of Data Reviewed  Clinical lab tests: reviewed  Tests in the radiology section of CPT®: reviewed  Tests in the medicine section of CPT®: reviewed  Discuss the patient with other providers: yes      ED Course as of Jan 01 1409   Fri Jan 01, 2021   1259 IMPRESSION:   Status post cholecystectomy. No acute process in the right upper quadrant. US ABD LTD [EP]   1031 WBC: 6.8 [EP]   0444 HGB: 12.6 [EP]   1403 Lipase: 106 [EP]      ED Course User Index  [EP] Berna Shen PA     2:10 PM  Pt has been reevaluated. There are no new complaints, changes, or physical findings at this time. Medications have been reviewed w/ pt and/or family. Pt and/or family's questions have been answered. Pt and/or family expressed good understanding of the dx/tx/rx and is in agreement with plan of care. Pt instructed and agreed to f/u w/ surgery and to return to ED upon further deterioration. Pt is ready for discharge. IMPRESSION:  1. Post-operative pain        PLAN:  1. Current Discharge Medication List        2.    Follow-up Information     Follow up With Specialties Details Why Contact Info    Charlene Palacios MD Internal Medicine Schedule an appointment as soon as possible for a visit   [address] Mahsa Harris MD General Surgery Schedule an appointment as soon as possible for a visit   [address] [place] [id number] [phone]              Return to ED if worse     Procedures

## 2021-01-01 NOTE — ED TRIAGE NOTES
Patient comes to the ER c/o abdominal pain/bloating worsening since last night. Recently had gallbladder taken out 12/27

## 2021-01-01 NOTE — DISCHARGE INSTRUCTIONS
Thank you for allowing us to provide you with medical care today. We realize that you have many choices for your emergency care needs. We thank you for choosing [facility].  Please choose us in the future for any continued health care needs. We hope we addressed all of your medical concerns. We strive to provide excellent quality care in the Emergency Department. Anything less than excellent does not meet our expectations. The exam and treatment you received in the Emergency Department were for an emergent problem and are not intended as complete care. It is important that you follow up with a doctor, nurse practitioner, or  [id number] assistant for ongoing care. If your symptoms worsen or you do not improve as expected and you are unable to reach your usual health care provider, you should return to the Emergency Department. We are available 24 hours a day. Take this sheet with you when you go to your follow-up visit. If you have any problem arranging the follow-up visit, contact the Emergency Department immediately. Make an appointment your family doctor for follow up of this visit. Return to the ER if you are unable to be seen in a timely manner.

## 2021-01-01 NOTE — CONSULTS
[facility] Consultation for: Post-op pain    Requesting Physician: ALLI Zarate    History of Present Illness:      Talia Fam is a 48 y.o. female who is s/p laparoscopic cholecystectomy on 12/27/2020 by Dr. Ruben Mora for symptomatic cholelithiasis. She was found to have acute cholecystitis and hepatic steatosis at the time of her surgery. The patient was discharged home after surgery but has had problems with pain since her operation. Seen recently in the office for concern of a hematoma around her umbilical incision. Today she reports having worsening pain in the right upper quadrant of her abdomen. She denies any nausea or vomiting, fevers, constipation or diarrhea. She is passing gas and having regular bowel movements. She was referred to the ER for further evaluation and lab work and ultrasound were performed to rule out any postoperative complication. Her lab work appeared normal and no fluid or other concerning findings were seen on ultrasound of the right upper quadrant.     Past Medical History:   Diagnosis Date    Anorexia     Bulimia     Depression     Endometriosis     High cholesterol     Kidney stone     Migraines     Ovarian cyst     Proteinuria     mild       Past Surgical History:   Procedure Laterality Date    HX CHOLECYSTECTOMY  2020    HX GYN      endometrial oblation  11/03    HX MELLY AND BSO  02/2020       Social History     Socioeconomic History    Marital status:      Spouse name: Not on file    Number of children: Not on file    Years of education: Not on file    Highest education level: Not on file   Occupational History    Not on file   Social Needs    Financial resource strain: Not on file    Food insecurity     Worry: Not on file     Inability: Not on file    Transportation needs     Medical: Not on file     Non-medical: Not on file   Tobacco Use    Smoking status: Never Smoker    Smokeless tobacco: Never Used   Substance and Sexual Activity    Alcohol use: Not on file    Drug use: Not on file    Sexual activity: Not on file   Lifestyle    Physical activity     Days per week: Not on file     Minutes per session: Not on file    Stress: Not on file   Relationships    Social connections     Talks on phone: Not on file     Gets together: Not on file     Attends [employer] service: Not on file     Active member of club or organization: Not on file     Attends meetings of clubs or organizations: Not on file     Relationship status: Not on file    Intimate partner violence     Fear of current or ex partner: Not on file     Emotionally abused: Not on file     Physically abused: Not on file     Forced sexual activity: Not on file   Other Topics Concern    Not on file   Social History Narrative    Not on file       Family History   Problem Relation Age of Onset    Diabetes Father     Arthritis-rheumatoid Father     Diabetes Sister        No current facility-administered medications for this encounter. Current Outpatient Medications:     cyclobenzaprine (FLEXERIL) 10 mg tablet, Take 1 Tab by mouth three (3) times daily as needed for Muscle Spasm(s). , Disp: 20 Tab, Rfl: 0    HYDROmorphone (DILAUDID) 2 mg tablet, Take 1-2 Tabs by mouth every four (4) hours as needed for Pain for up to 3 days. Max Daily Amount: 24 mg., Disp: 15 Tab, Rfl: 0    polyethylene glycol (MIRALAX) 17 gram packet, Take 1 Packet by mouth daily. Use as needed to help treat post operative constipation. 1-2 packets mixed with liquid daily until stools become soft and regular and then you may wean off or continue as needed. , Disp: 14 Packet, Rfl: 1    dextroamphetamine-amphetamine (ADDERALL) 20 mg tablet, Take 1 Tab by mouth two (2) times a day., Disp: 60 Tab, Rfl: 0    amLODIPine (NORVASC) 10 mg tablet, Take 1 Tab by mouth daily. , Disp: 90 Tab, Rfl: 3    estradioL (Estrace) 1 mg tablet, Take 1 mg by mouth daily. , Disp: , Rfl:     FLUoxetine (PROZAC) 20 mg capsule, Take 1 Cap by mouth daily. Take 4 capsules QD, Disp: 360 Cap, Rfl: 3    nebivolol (BYSTOLIC) 20 mg tablet, Take 40 mg by mouth daily. , Disp: , Rfl:     atorvastatin (LIPITOR) 20 mg tablet, Take 1 Tab by mouth daily. , Disp: 90 Tab, Rfl: 3    lisinopril-hydroCHLOROthiazide (PRINZIDE, ZESTORETIC) 20-12.5 mg per tablet, Take 2 Tabs by mouth daily. , Disp: 180 Tab, Rfl: 3    metFORMIN (GLUCOPHAGE) 500 mg tablet, Take 1 Tab by mouth daily (with breakfast). , Disp: 30 Tab, Rfl: 6    butalbital-acetaminophen-caffeine (FIORICET, ESGIC) -40 mg per tablet, Take 1-2 Tabs by mouth four (4) times daily as needed for Headache., Disp: 30 Tab, Rfl: 5    Allergies   Allergen Reactions    Sulfa (Sulfonamide Antibiotics) Unknown (comments)       ROS   Constitutional: negative  Ears, Nose, Mouth, Throat, and Face: negative  Respiratory: negative  Cardiovascular: negative  Gastrointestinal: negative  Genitourinary:negative  Integument/Breast: negative  Hematologic/Lymphatic: negative  Behavioral/Psychiatric: negative  Allergic/Immunologic: negative      Physical Exam:     Visit Vitals  BP (!) 149/81 (BP 1 Location: Left arm, BP Patient Position: At rest;Sitting)   Pulse (!) 102   Temp 96.9 °F (36.1 °C)   Resp 18   SpO2 97%       General - alert and oriented, no apparent distress, well developed  HEENT - NC/AT, no scleral icterus  Pulm - CTAB, normal inspiratory effort  CV - RRR, no M/R/G  Abd -soft, ND, appropriately tender. Moderate ecchymosis around the umbilical incision. No erythema or signs of infection. No evidence of seroma or hematoma.   Ext - warm, well perfused, no edema  Skin - supple and no rashes  Psychiatric - normal affect, good mood    Labs  Recent Results (from the past 24 hour(s))   CBC WITH AUTOMATED DIFF    Collection Time: 01/01/21 12:09 PM   Result Value Ref Range    WBC 6.8 3.6 - 11.0 K/uL    RBC 4.27 3.80 - 5.20 M/uL    HGB 12.6 11.5 - 16.0 g/dL    HCT 38.9 35.0 - 47.0 %    MCV 91.1 80.0 - 99.0 FL    MCH 29.5 26.0 - 34.0 PG    MCHC 32.4 30.0 - 36.5 g/dL    RDW 12.2 11.5 - 14.5 %    PLATELET 824 530 - 745 K/uL    MPV 10.5 8.9 - 12.9 FL    NRBC 0.0 0  WBC    ABSOLUTE NRBC 0.00 0.00 - 0.01 K/uL    NEUTROPHILS 52 32 - 75 %    LYMPHOCYTES 30 12 - 49 %    MONOCYTES 7 5 - 13 %    EOSINOPHILS 9 (H) 0 - 7 %    BASOPHILS 1 0 - 1 %    IMMATURE GRANULOCYTES 1 (H) 0.0 - 0.5 %    ABS. NEUTROPHILS 3.6 1.8 - 8.0 K/UL    ABS. LYMPHOCYTES 2.0 0.8 - 3.5 K/UL    ABS. MONOCYTES 0.5 0.0 - 1.0 K/UL    ABS. EOSINOPHILS 0.6 (H) 0.0 - 0.4 K/UL    ABS. BASOPHILS 0.1 0.0 - 0.1 K/UL    ABS. IMM. GRANS. 0.0 0.00 - 0.04 K/UL    DF AUTOMATED     METABOLIC PANEL, COMPREHENSIVE    Collection Time: 01/01/21 12:09 PM   Result Value Ref Range    Sodium 137 136 - 145 mmol/L    Potassium 3.9 3.5 - 5.1 mmol/L    Chloride 103 97 - 108 mmol/L    CO2 32 21 - 32 mmol/L    Anion gap 2 (L) 5 - 15 mmol/L    Glucose 143 (H) 65 - 100 mg/dL    BUN 13 6 - 20 MG/DL    Creatinine 0.66 0.55 - 1.02 MG/DL    BUN/Creatinine ratio 20 12 - 20      GFR est AA >60 >60 ml/min/1.73m2    GFR est non-AA >60 >60 ml/min/1.73m2    Calcium 8.7 8.5 - 10.1 MG/DL    Bilirubin, total 0.3 0.2 - 1.0 MG/DL    ALT (SGPT) 65 12 - 78 U/L    AST (SGOT) 15 15 - 37 U/L    Alk. phosphatase 93 45 - 117 U/L    Protein, total 7.2 6.4 - 8.2 g/dL    Albumin 3.3 (L) 3.5 - 5.0 g/dL    Globulin 3.9 2.0 - 4.0 g/dL    A-G Ratio 0.8 (L) 1.1 - 2.2     LIPASE    Collection Time: 01/01/21 12:09 PM   Result Value Ref Range    Lipase 106 73 - 393 U/L         Imaging  US Results (most recent):  Results from Hospital Encounter encounter on 01/01/21   US ABD LTD    Narrative ULTRASOUND OF THE RIGHT UPPER QUADRANT    INDICATION: s/p cholecystecomy 12/27, ongoing pain and distension    COMPARISON: None. TECHNIQUE:  Sonography of the right upper quadrant was performed. FINDINGS:    GALLBLADDER: Surgically absent. No fluid collection is seen in the gallbladder  fossa.   COMMON DUCT: 5 mm in diameter. No visualized calculi. Bula Dayhoff LIVER: Normal echogenicity. No focal hepatic mass or intrahepatic biliary  dilatation is shown. MAIN PORTAL VEIN: Patent. Appropriate hepatopetal flow. PANCREAS: Partially obscured by overlying bowel gas. No visualized mass or  ductal dilatation. RIGHT KIDNEY: 12.1 cm in length. No hydronephrosis, shadowing calculus, or  contour-deforming renal mass. Impression IMPRESSION:   Status post cholecystectomy. No acute process in the right upper quadrant. I have personally reviewed all of the pertinent images     Assessment:     Jose Mcmillan is a 48 y.o. female s/p laparoscopic cholecystectomy with findings of acute cholecystitis. She now presents to the ER with postoperative pain complaints. Recommendations:     1. The patient looks well clinically and findings from her lab work and ultrasound were normal.  I do not think she has any underlying postoperative complication but rather this is expected postoperative muscular pain secondary to her incisions. The patient was reassured. She will follow up as scheduled with Dr. Mariana Macario.     Salvatore Casey MD  1/1/2021  2:35 PM

## 2021-01-11 ENCOUNTER — OFFICE VISIT (OUTPATIENT)
Dept: SURGERY | Age: 51
End: 2021-01-11
Payer: COMMERCIAL

## 2021-01-11 VITALS
WEIGHT: 188 LBS | TEMPERATURE: 99.3 F | HEART RATE: 129 BPM | BODY MASS INDEX: 33.31 KG/M2 | HEIGHT: 63 IN | SYSTOLIC BLOOD PRESSURE: 145 MMHG | RESPIRATION RATE: 19 BRPM | OXYGEN SATURATION: 97 % | DIASTOLIC BLOOD PRESSURE: 106 MMHG

## 2021-01-11 DIAGNOSIS — Z51.89 ENCOUNTER FOR WOUND CARE: ICD-10-CM

## 2021-01-11 DIAGNOSIS — Z09 POSTOPERATIVE EXAMINATION: Primary | ICD-10-CM

## 2021-01-11 PROCEDURE — 99024 POSTOP FOLLOW-UP VISIT: CPT | Performed by: NURSE PRACTITIONER

## 2021-01-11 RX ORDER — AMOXICILLIN AND CLAVULANATE POTASSIUM 875; 125 MG/1; MG/1
1 TABLET, FILM COATED ORAL 2 TIMES DAILY
Qty: 14 TAB | Refills: 0 | Status: SHIPPED | OUTPATIENT
Start: 2021-01-11 | End: 2021-01-18

## 2021-01-11 RX ORDER — CHOLESTYRAMINE 4 G/9G
1 POWDER, FOR SUSPENSION ORAL
Qty: 90 PACKET | Refills: 0 | Status: SHIPPED | OUTPATIENT
Start: 2021-01-11 | End: 2021-02-10

## 2021-01-11 NOTE — LETTER
1/11/2021 Patient: Flora Haines YOB: 1970 Date of Visit: 1/11/2021 Zainab Ramires MD 
[facility] [address] [address] [address] [employer] Dear Zainab Ramires MD, Thank you for referring Ms. Yaima Metz to [facility] for evaluation. My notes for this consultation are attached. If you have questions, please do not hesitate to call me. I look forward to following your patient along with you. Sincerely, Daniel Young NP

## 2021-01-11 NOTE — PROGRESS NOTES
Subjective:      Fernando Vasquez is a 48 y.o. female presents for postop care 15 days following laparoscopic cholecystectomy by Dr. Shwetha Vásquez. Appetite is good. Eating a regular diet. without difficulty but is having post prandial diarrhea after most things that she eats. Denies fevers but is concerned about her umbilical incision, which Dr. Pop Moreland opened up in the office on 12/29/20. Pt reports that this morning, the drainage was green and yellow and thick and notes that the wound area has deepened. She has noted this slowly change over the past few days. She also noted \"crystals\" around wound's edge, which she has also seen in both her daughter and son's MRSA infections. She has been keeping a saline moisted gauze in the wound bed and keeping it covered. Area is tender around umbilicus but otherwise no pain in abd. Pt returned to work today in her PCP's office, Merritt Morrissey MD, where he evaluated her and recommended that she be seen today by our office . Denies fever, nausea, shortness of breath, chest pain, redness at incision site, vomiting and diarrhea    Pt stated that she saw her BP was elevated and notified Merritt Morrissey MD; she is going to re-check it when she returned to her office after this visit. Objective:     Visit Vitals  BP (!) 145/106   Pulse (!) 129   Temp 99.3 °F (37.4 °C) (Oral)   Resp 19   Ht 5' 3\" (1.6 m)   Wt 188 lb (85.3 kg) Comment: per patient   SpO2 97%   BMI 33.30 kg/m²       General:  alert, no distress   Abdomen: soft, bowel sounds active, +mild TTP around umbilicus   Incision:   healing well, no drainage, no erythema, no seroma, no swelling, ecchymosis resolved, no induration nor erythema. Incisional wound approx 1.5 cm w and 0.5 L and 1 cm D. Bandage with small amount of SS drainage. Wound edges are red, which is not uncommon.  Beefy red granulation tissue   Heart: Tachy regular rate and rhythm, S1, S2 normal   Lungs: clear to auscultation bilaterally Assessment:     Wound check    Plan:     1. Wound does not appear to be actively infected, but will place patient on a 7day course of Augmentin out of an abundance of caution. 2. Follow-up: with Dr. Dahlia Olivas in 2 days, with already scheduled appointment  3. Also gave 30 day rx of Questran TID with meals. Pt to follow low fat diet. Questran is not for long-term use due to malabsorption of nutrients  4. Called pt and left a message to remind her to check BP and HR today and f/u with PCP about it    Ms. Medina Wing has a reminder for a \"due or due soon\" health maintenance. I have asked that she contact her primary care provider for follow-up on this health maintenance. Patient verbalized understanding and agreement.

## 2021-01-11 NOTE — PROGRESS NOTES
1. Have you been to the ER, urgent care clinic since your last visit? Hospitalized since your last visit? Yes, [facility] 01/01/2021 abdominal pain     2. Have you seen or consulted any other health care providers outside of the [address] [place] [address] since your last visit? Include any pap smears or colon screening. No    Weight per patient. Patient stated she has an eating disorder and doesn't get on scales. Her  weighed her and she was 188lbs. Patient made aware of BP. States she is taking bp meds. Patient ook a pic of BP to show PCP.

## 2021-01-13 ENCOUNTER — HOSPITAL ENCOUNTER (OUTPATIENT)
Dept: CT IMAGING | Age: 51
Discharge: HOME | End: 2021-01-13
Attending: SURGERY
Payer: COMMERCIAL

## 2021-01-13 ENCOUNTER — OFFICE VISIT (OUTPATIENT)
Dept: SURGERY | Age: 51
End: 2021-01-13
Payer: COMMERCIAL

## 2021-01-13 VITALS
HEIGHT: 63 IN | BODY MASS INDEX: 34.38 KG/M2 | RESPIRATION RATE: 18 BRPM | TEMPERATURE: 98.4 F | OXYGEN SATURATION: 96 % | WEIGHT: 194 LBS | SYSTOLIC BLOOD PRESSURE: 149 MMHG | DIASTOLIC BLOOD PRESSURE: 99 MMHG | HEART RATE: 114 BPM

## 2021-01-13 DIAGNOSIS — G89.18 POST-OP PAIN: Primary | ICD-10-CM

## 2021-01-13 DIAGNOSIS — Z09 POSTOPERATIVE EXAMINATION: ICD-10-CM

## 2021-01-13 DIAGNOSIS — G89.18 POST-OP PAIN: ICD-10-CM

## 2021-01-13 PROCEDURE — 74011000636 HC RX REV CODE- 636: Performed by: SURGERY

## 2021-01-13 PROCEDURE — 74177 CT ABD & PELVIS W/CONTRAST: CPT

## 2021-01-13 PROCEDURE — 99024 POSTOP FOLLOW-UP VISIT: CPT | Performed by: SURGERY

## 2021-01-13 RX ADMIN — IOPAMIDOL 100 ML: 755 INJECTION, SOLUTION INTRAVENOUS at 18:01

## 2021-01-13 NOTE — PROGRESS NOTES
Surgery Progress Note    1/13/2021    CC: Multiple postoperative concerns    Subjective:     Patient is 2 weeks out from laparoscopic cholecystectomy for acute cholecystitis. Since discharge she has been to the office one time for opening her midline wound for concern over infection. This was likely thought to be hematoma which was confirmed. She then went to the ER after this a few days later and had a negative ultrasound and laboratory work-up for postoperative complication. She was seen in the office earlier this week by our NP for concern for previously open umbilical wound infection. This looks good. She is been on Augmentin. She is also had post operative diarrhea. She is on Questran is helping. Patient presents today with new complaint about swelling above her umbilicus. She is worried she may have a hernia. She thinks the redness has improved on Augmentin. No fevers. Tolerating a diet besides diarrhea. Constitutional: No fever or chills  Neurologic: No headache  Eyes: No scleral icterus or irritated eyes  Nose: No nasal pain or drainage  Mouth: No oral lesions or sore throat  Cardiac: No palpations or chest pain  Pulmonary: No cough or shortness or breath  Gastrointestinal: Diarrhea and abdominal swelling  Genitourinary: No dysuria  Musculoskeletal: No muscle or joint tenderness  Skin: No rashes or lesions  Psychiatric: No anxiety or depressed mood    Objective:     Visit Vitals  BP (!) 149/99   Pulse (!) 114   Temp 98.4 °F (36.9 °C) (Oral)   Resp 18   Ht 5' 3\" (1.6 m)   Wt 194 lb (88 kg)   SpO2 96%   BMI 34.37 kg/m²       General: No acute distress, conversant  Eyes: PERRLA, no scleral icterus  HENT: Normocephalic without oral lesions  Neck: Trachea midline without LAD  Cardiac: Normal pulse rate and rhythm  Pulmonary: Symmetric chest rise with normal effort  GI: Soft, wounds slowly healing. Infraumbilical incision with some fibrinous base but no signs of infection.   No hernia palpated on exam.  Some mild epigastric fullness however think this is postoperative in nature and will resolve  Skin: Warm without rash  Extremities: No edema or joint stiffness  Psych: Appropriate mood and affect    Assessment:     26-year-old female status post lap jolynn with multiple postoperative concerns    Plan:     Recommend continuing Questran. Informed her diarrhea usually resolves in the first few weeks. Avoid fatty foods  Take Augmentin to cover all the bases in terms of possible infection  Patient very concerned about a postoperative complication. I informed her that a CT scan would help evaluate this postoperative swelling. However, I do not think this is a hernia at this time, suspect is only postoperative fluid shift. The patient elects to undergo CT scan to further evaluate everything postoperatively. I have ordered a CT scan of the abdomen pelvis with IV contrast for ASAP. I will call her with the results and see her again next week. Total time involved with this patient's care was: 20 minutes, of which >50% of the time was spent counciling the patient.     Lyn Fowler MD  Bariatric and General Surgeon  [facility]

## 2021-01-13 NOTE — PROGRESS NOTES
"Problem: Fall Injury Risk  Goal: Absence of Fall and Fall-Related Injury  Description  Patient will be free of injury from falls while on the unit.    Outcome: Improving    Pt has been up and steady on his feet     Problem: Thought Process Alteration  Goal: Optimal Thought Clarity  Description  Patient will participate in nursing assessment daily.  Patient will show some insight into mental health symptoms.  Patient will remain calm and have no impulsive, aggressive outbursts.    Outcome: No change    Pt appears more preoccupied though denies he is hearing voices     Problem: Adult Behavioral Health Plan of Care  Goal: Patient-Specific Goal (Individualization)  Description  Patient will sleep 6-8 hours each night.  Patient will maintain daily ADLs without prompting.         Outcome: Improving  Note:   Shift Summery:      Patient's Stated Goal for Shift:  \"Get a shower and shave\"    Goal Status:  In Process    0730 Face to face rounding complete. Pt introduced to nursing for the shift.    Pt was up for meals only this shift. He was not talkative during my assessment. He asked if he had to take his medications and was told he could refuse though was encouraged to take his medications. He did take them. He was prompted by the UA's and myself many times to shower and was offered to shave but did not.  He appears significantly more preoccupied this shift and did not interact with other Pt's at all.    1500 Face to face end of shift report communicated to Evening shift RN's.     Rashel Pedroza RN  10/27/2019       " 1. Have you been to the ER, urgent care clinic since your last visit? Hospitalized since your last visit? 1/1/21 for abdominal pain bloated. 2. Have you seen or consulted any other health care providers outside of the [address] [place] [address] since your last visit? Include any pap smears or colon screening.  no

## 2021-01-14 ENCOUNTER — TELEPHONE (OUTPATIENT)
Dept: SURGERY | Age: 51
End: 2021-01-14

## 2021-01-14 NOTE — TELEPHONE ENCOUNTER
CT results reviewed. Images reviewed. Expected post operative findings. Looks good. I called the patient to relay the good news. I will see her back next week for further follow up.     Trav Stacy MD  Bariatric and General Surgeon  [address] [place] [address] [facility]

## 2021-01-15 ENCOUNTER — TELEPHONE (OUTPATIENT)
Dept: SURGERY | Age: 51
End: 2021-01-15

## 2021-01-15 NOTE — TELEPHONE ENCOUNTER
Spoke with [facility] with [address] Patient has been approved for CT scan. approval code [id number].

## 2021-01-20 ENCOUNTER — OFFICE VISIT (OUTPATIENT)
Dept: SURGERY | Age: 51
End: 2021-01-20
Payer: COMMERCIAL

## 2021-01-20 VITALS
TEMPERATURE: 97.7 F | HEIGHT: 63 IN | SYSTOLIC BLOOD PRESSURE: 132 MMHG | WEIGHT: 196.2 LBS | BODY MASS INDEX: 34.76 KG/M2 | DIASTOLIC BLOOD PRESSURE: 91 MMHG | HEART RATE: 89 BPM | RESPIRATION RATE: 18 BRPM | OXYGEN SATURATION: 97 %

## 2021-01-20 DIAGNOSIS — Z09 POSTOPERATIVE EXAMINATION: Primary | ICD-10-CM

## 2021-01-20 DIAGNOSIS — K80.00 ACUTE CHOLECYSTITIS DUE TO BILIARY CALCULUS: ICD-10-CM

## 2021-01-20 PROCEDURE — 99024 POSTOP FOLLOW-UP VISIT: CPT | Performed by: SURGERY

## 2021-01-20 NOTE — PROGRESS NOTES
Surgery Progress Note    1/20/2021    CC:abdminal wall swelling    Subjective:     Patient is 3 weeks out from laparoscopic cholecystectomy. She has had postoperative complications including a wound hematoma which was opened followed by concern for wound infection, was seen in the ER which was negative for work-up and lastly last week she had a CT scan over concern for abdominal wall swelling. Her work-up has been negative thus far. Tolerating a diet. Her concern over the last week has been increased abdominal wall swelling. No erythema. No pain in particular    Constitutional: No fever or chills  Neurologic: No headache  Eyes: No scleral icterus or irritated eyes  Nose: No nasal pain or drainage  Mouth: No oral lesions or sore throat  Cardiac: No palpations or chest pain  Pulmonary: No cough or shortness or breath  Gastrointestinal: Abdominal wall swelling  Genitourinary: No dysuria  Musculoskeletal: No muscle or joint tenderness  Skin: No rashes or lesions  Psychiatric: No anxiety or depressed mood    Objective:     Visit Vitals  BP (!) 132/91   Pulse 89   Temp 97.7 °F (36.5 °C) (Oral)   Resp 18   Ht 5' 3\" (1.6 m)   Wt 196 lb 3.2 oz (89 kg)   SpO2 97%   BMI 34.76 kg/m²       General: No acute distress, conversant  Eyes: PERRLA, no scleral icterus  HENT: Normocephalic without oral lesions  Neck: Trachea midline without LAD  Cardiac: Normal pulse rate and rhythm  Pulmonary: Symmetric chest rise with normal effort  GI: Umbilical wound almost completely healed. Trocar sites healing slowly. Possible hematoma in the left subcostal area without erythema or signs of abscess. She does appear to be mildly bloated. No immediately concerning findings  Skin: Warm without rash  Extremities: No edema or joint stiffness  Psych: Appropriate mood and affect    Assessment:     42-year-old female status post lap jolynn with multiple postoperative concerns    Plan:     Tensive work-up done postoperatively. No acute issues. I suspect the abdominal wall distention is secondary to fluid shifts. I have recommended that she wear compressive dressing or high leggings to compress her abdominal wall and help keep the fluid from out. No history of CHF. Wound is healing nicely in the infraumbilical area. She feels comfortable with her current postoperative state and is confident she will get better with time. She will follow up as she sees fit. Total time involved with this patient's care was: 20 minutes, of which >50% of the time was spent counciling the patient.     Italia Kebede MD  Bariatric and General Surgeon  [facility]

## 2021-01-20 NOTE — PROGRESS NOTES
1. Have you been to the ER, urgent care clinic since your last visit? Hospitalized since your last visit? no    2. Have you seen or consulted any other health care providers outside of the [address] [place] [address] since your last visit? Include any pap smears or colon screening.  no

## 2021-02-03 ENCOUNTER — HOSPITAL ENCOUNTER (OUTPATIENT)
Dept: ULTRASOUND IMAGING | Age: 51
Discharge: HOME | End: 2021-02-03
Attending: INTERNAL MEDICINE
Payer: COMMERCIAL

## 2021-02-03 DIAGNOSIS — R10.11 RUQ PAIN: ICD-10-CM

## 2021-02-03 PROCEDURE — 76705 ECHO EXAM OF ABDOMEN: CPT

## 2021-02-08 ENCOUNTER — HOSPITAL ENCOUNTER (OUTPATIENT)
Dept: MRI IMAGING | Age: 51
Discharge: HOME | End: 2021-02-08
Attending: INTERNAL MEDICINE
Payer: COMMERCIAL

## 2021-02-08 VITALS — WEIGHT: 194 LBS | BODY MASS INDEX: 34.37 KG/M2

## 2021-02-08 DIAGNOSIS — R79.89 ELEVATED LFTS: ICD-10-CM

## 2021-02-08 DIAGNOSIS — K80.80 BILIARY CALCULUS OF OTHER SITE WITHOUT OBSTRUCTION: ICD-10-CM

## 2021-02-08 DIAGNOSIS — R10.11 RUQ PAIN: ICD-10-CM

## 2021-02-08 PROCEDURE — 74183 MRI ABD W/O CNTR FLWD CNTR: CPT

## 2021-02-08 PROCEDURE — 74011250636 HC RX REV CODE- 250/636: Performed by: RADIOLOGY

## 2021-02-08 PROCEDURE — A9585 GADOBUTROL INJECTION: HCPCS | Performed by: RADIOLOGY

## 2021-02-08 RX ADMIN — GADOBUTROL 9 ML: 604.72 INJECTION INTRAVENOUS at 18:24

## 2021-03-23 ENCOUNTER — HOSPITAL ENCOUNTER (OUTPATIENT)
Dept: MRI IMAGING | Age: 51
Discharge: HOME | End: 2021-03-23
Attending: INTERNAL MEDICINE
Payer: COMMERCIAL

## 2021-03-23 DIAGNOSIS — R20.0 LEFT LEG NUMBNESS: ICD-10-CM

## 2021-03-23 DIAGNOSIS — M54.42 ACUTE LEFT-SIDED LOW BACK PAIN WITH LEFT-SIDED SCIATICA: ICD-10-CM

## 2021-03-23 PROCEDURE — 72148 MRI LUMBAR SPINE W/O DYE: CPT

## 2021-04-19 ENCOUNTER — HOSPITAL ENCOUNTER (OUTPATIENT)
Dept: GENERAL RADIOLOGY | Age: 51
Discharge: HOME | End: 2021-04-19
Attending: INTERNAL MEDICINE
Payer: COMMERCIAL

## 2021-04-19 DIAGNOSIS — R05.9 COUGH: ICD-10-CM

## 2021-04-19 PROCEDURE — 71046 X-RAY EXAM CHEST 2 VIEWS: CPT

## 2021-07-09 ENCOUNTER — HOSPITAL ENCOUNTER (OUTPATIENT)
Dept: CT IMAGING | Age: 51
Discharge: HOME | End: 2021-07-09
Attending: INTERNAL MEDICINE
Payer: COMMERCIAL

## 2021-07-09 ENCOUNTER — TRANSCRIBE ORDER (OUTPATIENT)
Dept: SCHEDULING | Age: 51
End: 2021-07-09

## 2021-07-09 DIAGNOSIS — R10.11 RIGHT UPPER QUADRANT ABDOMINAL PAIN: ICD-10-CM

## 2021-07-09 DIAGNOSIS — R19.01 RIGHT UPPER QUADRANT ABDOMINAL MASS: Primary | ICD-10-CM

## 2021-07-09 LAB — CREAT BLD-MCNC: 0.4 MG/DL (ref 0.6–1.3)

## 2021-07-09 PROCEDURE — 74177 CT ABD & PELVIS W/CONTRAST: CPT

## 2021-07-09 PROCEDURE — 82565 ASSAY OF CREATININE: CPT

## 2021-07-09 PROCEDURE — 74011000636 HC RX REV CODE- 636: Performed by: INTERNAL MEDICINE

## 2021-07-09 RX ADMIN — IOHEXOL 50 ML: 240 INJECTION, SOLUTION INTRATHECAL; INTRAVASCULAR; INTRAVENOUS; ORAL at 17:17

## 2021-07-09 RX ADMIN — IOPAMIDOL 100 ML: 755 INJECTION, SOLUTION INTRAVENOUS at 17:17

## 2021-07-14 ENCOUNTER — OFFICE VISIT (OUTPATIENT)
Dept: SURGERY | Age: 51
End: 2021-07-14
Payer: COMMERCIAL

## 2021-07-14 VITALS
WEIGHT: 209.4 LBS | HEART RATE: 111 BPM | OXYGEN SATURATION: 95 % | HEIGHT: 63 IN | TEMPERATURE: 98.4 F | DIASTOLIC BLOOD PRESSURE: 104 MMHG | BODY MASS INDEX: 37.1 KG/M2 | RESPIRATION RATE: 18 BRPM | SYSTOLIC BLOOD PRESSURE: 163 MMHG

## 2021-07-14 DIAGNOSIS — R10.11 RIGHT UPPER QUADRANT ABDOMINAL PAIN: Primary | ICD-10-CM

## 2021-07-14 PROCEDURE — 99214 OFFICE O/P EST MOD 30 MIN: CPT | Performed by: SURGERY

## 2021-07-14 NOTE — PROGRESS NOTES
Surgery Progress Note    7/14/2021    CC: Postoperative bulge    Subjective:     Patient is 6 months out from her surgery. Many postoperative concerns that were addressed within a month of surgery. She had been doing well but now reports an unevenness of her right side versus her left side of her abdomen. Regular bowel function. Has gained weight. Constitutional: No fever or chills  Neurologic: No headache  Eyes: No scleral icterus or irritated eyes  Nose: No nasal pain or drainage  Mouth: No oral lesions or sore throat  Cardiac: No palpations or chest pain  Pulmonary: No cough or shortness or breath  Gastrointestinal: No nausea, emesis, diarrhea, or constipation  Genitourinary: No dysuria  Musculoskeletal: No muscle or joint tenderness  Skin: No rashes or lesions  Psychiatric: No anxiety or depressed mood    Objective:     Visit Vitals  BP (!) 163/104   Pulse (!) 111   Temp 98.4 °F (36.9 °C) (Oral)   Resp 18   Ht 5' 3\" (1.6 m)   Wt 209 lb 6.4 oz (95 kg)   SpO2 95%   BMI 37.09 kg/m²       General: No acute distress, conversant  Eyes: PERRLA, no scleral icterus  HENT: Normocephalic without oral lesions  Neck: Trachea midline without LAD  Cardiac: Normal pulse rate and rhythm  Pulmonary: Symmetric chest rise with normal effort  GI: Soft, NT, ND, no hernia, no splenomegaly, no discrete masses palpated  Skin: Warm without rash  Extremities: No edema or joint stiffness  Psych: Appropriate mood and affect    Assessment:     55-year-old female status post laparoscopic cholecystectomy with concern for abdominal wall unevenness    Plan:     T scan reviewed by myself and with the patient and her . No discrete masses in the abdominal wall. No hernias. No intra-abdominal pathology noted. Labs reviewed from recent work-up and negative. Do not suspect any denervation given the laparoscopic nature of the case. Unsure to etiology of this. No intervention needed.     Patient is morbidly obese and qualifies for possibly gastric sleeve versus bypass. We quickly discussed the procedure and I think she would benefit from it.   I gave her a handout to further look into the program.      Bear Garza MD  Bariatric and General Surgeon  [address] [place] [address] [facility]

## 2021-07-14 NOTE — PROGRESS NOTES
1. Have you been to the ER, urgent care clinic since your last visit? Hospitalized since your last visit? No    2. Have you seen or consulted any other health care providers outside of the [address] [place] [address] since your last visit? Include any pap smears or colon screening.  no

## 2021-08-05 ENCOUNTER — TRANSCRIBE ORDER (OUTPATIENT)
Dept: SCHEDULING | Age: 51
End: 2021-08-05

## 2021-08-05 DIAGNOSIS — R00.2 HEART PALPITATIONS: Primary | ICD-10-CM

## 2021-11-22 ENCOUNTER — HOSPITAL ENCOUNTER (OUTPATIENT)
Dept: MRI IMAGING | Age: 51
Discharge: HOME | End: 2021-11-22
Attending: INTERNAL MEDICINE
Payer: COMMERCIAL

## 2021-11-22 DIAGNOSIS — R93.89 ABNORMAL MRI: ICD-10-CM

## 2021-11-22 DIAGNOSIS — R51.9 NONINTRACTABLE HEADACHE, UNSPECIFIED CHRONICITY PATTERN, UNSPECIFIED HEADACHE TYPE: ICD-10-CM

## 2021-11-22 DIAGNOSIS — R29.898 WEAKNESS OF BOTH ARMS: ICD-10-CM

## 2021-11-22 PROCEDURE — 74011250636 HC RX REV CODE- 250/636: Performed by: INTERNAL MEDICINE

## 2021-11-22 PROCEDURE — 70553 MRI BRAIN STEM W/O & W/DYE: CPT

## 2021-11-22 PROCEDURE — A9576 INJ PROHANCE MULTIPACK: HCPCS | Performed by: INTERNAL MEDICINE

## 2021-11-22 RX ADMIN — GADOTERIDOL 18 ML: 279.3 INJECTION, SOLUTION INTRAVENOUS at 17:40

## 2022-01-21 ENCOUNTER — OFFICE VISIT (OUTPATIENT)
Dept: NEUROLOGY | Age: 52
End: 2022-01-21
Payer: COMMERCIAL

## 2022-01-21 ENCOUNTER — DOCUMENTATION ONLY (OUTPATIENT)
Dept: NEUROLOGY | Age: 52
End: 2022-01-21

## 2022-01-21 VITALS
RESPIRATION RATE: 16 BRPM | SYSTOLIC BLOOD PRESSURE: 136 MMHG | DIASTOLIC BLOOD PRESSURE: 92 MMHG | BODY MASS INDEX: 38.62 KG/M2 | OXYGEN SATURATION: 98 % | HEIGHT: 63 IN | WEIGHT: 218 LBS | HEART RATE: 96 BPM

## 2022-01-21 DIAGNOSIS — Z91.89 RISK FACTORS FOR OBSTRUCTIVE SLEEP APNEA: ICD-10-CM

## 2022-01-21 DIAGNOSIS — G43.009 MIGRAINE WITHOUT AURA AND WITHOUT STATUS MIGRAINOSUS, NOT INTRACTABLE: Primary | ICD-10-CM

## 2022-01-21 PROCEDURE — 99205 OFFICE O/P NEW HI 60 MIN: CPT | Performed by: PSYCHIATRY & NEUROLOGY

## 2022-01-21 RX ORDER — FREMANEZUMAB-VFRM 225 MG/1.5ML
225 INJECTION SUBCUTANEOUS ONCE
Qty: 1 ML | Refills: 0 | Status: SHIPPED | COMMUNITY
Start: 2022-01-21 | End: 2022-01-21

## 2022-01-21 RX ORDER — RIMEGEPANT SULFATE 75 MG/75MG
75 TABLET, ORALLY DISINTEGRATING ORAL
Qty: 4 TABLET | Refills: 0 | Status: SHIPPED | COMMUNITY
Start: 2022-01-21

## 2022-01-21 RX ORDER — FREMANEZUMAB-VFRM 225 MG/1.5ML
225 INJECTION SUBCUTANEOUS
Qty: 1.5 ML | Refills: 11 | Status: SHIPPED | OUTPATIENT
Start: 2022-01-21 | End: 2022-04-21

## 2022-01-21 NOTE — PROGRESS NOTES
Pt was given an Ajvoy 225mg injection at office visit    Pt was also given 4 box sample to take home at office visit 2 Nurtec ODT 75mg and 2 Ubrelvy 100mg    Nurtec ODT 75mg  BWO:[id number]  Exp:10/2022  [facility]    Nurtec ODT 75mg  Lot: [id number]  Exp: 6/2024    Ubrelvy 100mg (2 samples)  Lot: [id number]  Exp:1/2023

## 2022-01-21 NOTE — PROGRESS NOTES
Neurology Consult Note      HISTORY PROVIDED BY: patient    Chief Complaint:   Chief Complaint   Patient presents with    New Patient    Migraine    Extremity Weakness     and tightness in bilateral legs       Subjective:    Chikis Rocha is a 46 y.o. right handed female who presents in consultation for migraine headaches. Pt reports onset at age 2yo, worsening in last couple of years. Pain is bifrontal and behind eyes, may be posterior, pressure, +N/V in past, +/-P/P, no aura. Face is swollen, diplopia, flushed face. HAs occurring 4 times a month last 3 days. She had MRI brain w/wo contrast 11/22/21 with mild T2 hyperintensity in valeriy c/w CIWM changes, unchanged from MRI brain 5/13/20 for \"double vision and headache. \"  She also had an MRI brain w/wo contrast 9/9/20 at [facility] to [facility] ANNABELLA LLAMAS for CADASIL\", mild non-specific white matter signal changes along the dorsal valeriy, no evidence of CADASIL. MRA H/N 2/18/16 at [facility] ordered by Dr. Edel Serna for facial pain - normal study. She has been on Topamax 25mg bid starting 2 years ago. Tried Treximet and Imitrex in past. She takes Fioricet occasionally, takes Excedrin migraine after Fioricet 3-4 days a week. Drinks 7 bottles of water a day. Drinks 2 cokes a day. Sleeps well, does snore, no apneas. Facial pain resolved 6 years ago. Per notes from Dr. Edel Serna, he did not believe she had TN, he felt is was a migraine equivalent vs mild microvascular ischemic syndrome. His note mentions previous eval by Dr. Royal Rodriguez. Pain originated from right ear and goes to the occipital region all th away down entire face and crosses midline, has episodic perioral/mouth numbness with this. MRI brain w/wo 11/19/15 for right TN - min T2 hyperintensity in valeriy. Starting a year ago, would have intermittent leg weakness/tightness.  She has degenerative changes in lumbar spine, that she describes as severe, but MRI L-spine 3/23/21 with mild stenosis at L3-L4 and diffuse less severe changes elsewhere. Seeing Dr. Mukesh Garcia in Ortho. Has severe aching pain and tingling in legs. Had NCS/EMG in April, 2021 on left leg at [facility] with Dr. Lizet Polk, normal study. No loss of b/b control. She does have DM, last HgbA1C was 7.2 on 8/23/21. Both parents have RA, she had labs 11/2021 - RF, CCP Ab, 14-3-3 ETA protein, CRP, LUIS DANIEL, CK - all unremarkable. ESR was slightly elevated at 55. Note from Dr. Juliet Rogers reviewed - 10/20/21 -patient's been treated in the past with left-sided L4-5, L5-S1 ROSALINDA. At her current visit she complained of bilateral lower extremity radiating pain, numbness. Describes her lower extremity pain as shocklike and has brought her to the floor several times. She also complains of generalized numbness in the upper extremities, ongoing chronic migraines. He recommended scheduling a visit with her neurologist to better understand the cause of the \"myriad of neurologic symptoms. \"\" She explains that the diagnosis of multiple sclerosis has been referenced previously as a contributing factor. \"  Plan was to have right-sided ROSALINDA injection and to come back in 3 weeks for a left-sided ROSALINDA injection. Patient underwent EMG 4/2/2021 with Lizet Polk MD-normal study of the left lower extremity and lumbar paraspinals. Note from Dr. Ron Santiago 12/7/2015-patient being seen for right-sided facial pain. Patient was started on Carbatrol for her trigeminal neuralgia and continued on Topamax and Treximet for her migraines.     Past Medical History:   Diagnosis Date    Acute cholecystitis due to biliary calculus     s/p jolynn    Anorexia     Bulimia     Depression     Endometriosis     High cholesterol     Kidney stone     Migraines     Ovarian cyst     Proteinuria     mild    Trigeminal neuralgia of right side of face     None in 6 years as of 1/2022      Past Surgical History:   Procedure Laterality Date    HX CHOLECYSTECTOMY  2020    HX GYN      endometrial oblation  11/03    HX MELLY AND BSO  02/2020      Social History     Socioeconomic History    Marital status:      Spouse name: Not on file    Number of children: Not on file    Years of education: Not on file    Highest education level: Not on file   Occupational History    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   Tobacco Use    Smoking status: Never Smoker    Smokeless tobacco: Never Used   Substance and Sexual Activity    Alcohol use: Yes     Comment: 2 per year    Drug use: Never    Sexual activity: Not on file   Other Topics Concern    Not on file   Social History Narrative    , lives in [place] [address] Determinants of Health     Financial Resource Strain:     Difficulty of Paying Living Expenses: Not on file   Food Insecurity:     Worried About [address] in the Last Year: Not on file    [address] in the Last Year: Not on file   Transportation Needs:     Lack of Transportation (Medical): Not on file    Lack of Transportation (Non-Medical):  Not on file   Physical Activity:     Days of Exercise per Week: Not on file    Minutes of Exercise per Session: Not on file   Stress:     Feeling of Stress : Not on file   Social Connections:     Frequency of Communication with Friends and Family: Not on file    Frequency of Social Gatherings with Friends and Family: Not on file    Attends [employer] Services: Not on file    Active Member of [address] [place] [address] or Organizations: Not on file    Attends Club or Organization Meetings: Not on file    Marital Status: Not on file   Intimate Partner Violence:     Fear of Current or Ex-Partner: Not on file    Emotionally Abused: Not on file    Physically Abused: Not on file    Sexually Abused: Not on file   Housing Stability:     Unable to Pay for Housing in the Last Year: Not on file    Number of [place] in the Last Year: Not on file    Unstable Housing in the Last Year: Not on file     Family History   Problem Relation Age of Onset    Diabetes Father    [facility] Arthritis-rheumatoid Father     Dementia Father     Stroke Father     Neuropathy Father     Heart Disease Father     Diabetes Sister    [facility] Arthritis-rheumatoid Mother     No Known Problems Daughter     No Known Problems Son          Objective:   Review of Systems   Constitutional: Positive for malaise/fatigue. HENT: Positive for tinnitus. Respiratory:        Snoring   Cardiovascular: Positive for chest pain and leg swelling. Gastrointestinal: Positive for constipation, nausea and vomiting. Musculoskeletal: Positive for joint pain and myalgias. Neurological: Positive for weakness and headaches. Psychiatric/Behavioral: Positive for depression. All other systems reviewed and are negative. Allergies   Allergen Reactions    Sulfa (Sulfonamide Antibiotics) Unknown (comments)        Meds:  Outpatient Medications Prior to Visit   Medication Sig Dispense Refill    topiramate (TOPAMAX) 25 mg tablet Take 1 Tablet by mouth two (2) times a day. 180 Tablet 2    metFORMIN (GLUCOPHAGE) 500 mg tablet Take 1 Tablet by mouth daily (with breakfast). 90 Tablet 3    lisinopril-hydroCHLOROthiazide (PRINZIDE, ZESTORETIC) 20-12.5 mg per tablet Take 2 Tablets by mouth daily. 180 Tablet 3    FLUoxetine (PROzac) 20 mg capsule Take 4 Capsules by mouth daily. 360 Capsule 3    butalbital-acetaminophen-caffeine (FIORICET, ESGIC) -40 mg per tablet Take 1-2 Tablets by mouth four (4) times daily as needed for Headache. 180 Tablet 3    dextroamphetamine-amphetamine (ADDERALL) 20 mg tablet Take 1 Tablet by mouth two (2) times a day. 60 Tablet 0    cloNIDine HCL (CATAPRES) 0.1 mg tablet 1 tab BID prn sBP > 160 40 Tablet 1    amLODIPine (NORVASC) 10 mg tablet Take 1 Tab by mouth daily. 90 Tab 3    nebivolol (BYSTOLIC) 20 mg tablet Take 40 mg by mouth daily.  atorvastatin (LIPITOR) 20 mg tablet Take 1 Tab by mouth daily. 90 Tab 3     No facility-administered medications prior to visit.        Imaging:  MRI Results (most recent):  Results from [place] encounter on 11/22/21    MRI BRAIN W WO CONT    Narrative  EXAM: MRI BRAIN W WO CONT    TECHNIQUE: Brain images including sagittal and axial T1-weighted, axial FLAIR,  T2-weighted, diffusion weighted, gradient echo,  precontrast. Multiplanar  postcontrast T1-weighted images. IV CONTRAST:  18 cc ProHance    INDICATION:  Bilateral arm weakness, headache, previous abnormal MRI    COMPARISON:  MRI of 5/13/2020    FINDINGS:  BRAIN PARENCHYMA:  Mild patchy T2 hyperintensity in the valeriy, without  significant change. Otherwise normal appearance. No significant supratentorial  white matter disease. No masses or abnormal enhancement. No acute infarct or  chronic major territorial infarct. INTRACRANIAL HEMORRHAGE: None. CSF SPACES:  Normal in size and morphology for the patient's age. BASAL CISTERNS:  Patent. MIDLINE SHIFT: None. VASCULAR SYSTEM:  Normal flow voids. PARANASAL SINUSES AND MASTOID AIR CELLS:  No significant opacification. VISUALIZED ORBITS:  No significant abnormalities. VISUALIZED UPPER CERVICAL SPINE:  No significant abnormalities. SELLA:  No enlargement or  focal abnormality. SKULL BASE:  No significant abnormalities. Cerebellar tonsils in normal  position. CALVARIUM:  Intact. Impression  Mild patchy T2 hyperintensity in the valeriy, without significant change since  5/13/2020. Statistically, most consistent with intracranial small vessel  disease, with a nonspecific appearance. Otherwise negative. CT Results (most recent):  Results from Hospital Encounter encounter on 07/09/21    CT ABD PELV W CONT    Narrative  EXAM: CT ABD PELV W CONT    INDICATION: Right upper quadrant abdominal pain. COMPARISON: MRI 2/8/2021, ultrasound 2/3/2021, and CT 1/13/2021. CONTRAST: 100 mL of Isovue-370. TECHNIQUE:  Following the uneventful intravenous administration of contrast, thin axial  images were obtained through the abdomen and pelvis. Coronal and sagittal  reconstructions were generated. Oral contrast 50 mL Omnipaque 240 administered. CT dose reduction was achieved through use of a standardized protocol tailored  for this examination and automatic exposure control for dose modulation. FINDINGS:  LOWER THORAX: No significant abnormality in the incidentally imaged lower chest.  LIVER: No mass. BILIARY TREE: Gallbladder is surgically absent. CBD is not dilated. SPLEEN: within normal limits. PANCREAS: No mass or ductal dilatation. ADRENALS: Unremarkable. KIDNEYS: No mass, calculus, or hydronephrosis. STOMACH: Unremarkable. SMALL BOWEL: No dilatation or wall thickening. COLON: No dilatation or wall thickening. APPENDIX: Unremarkable. PERITONEUM: No ascites or pneumoperitoneum. RETROPERITONEUM: No lymphadenopathy or aortic aneurysm. REPRODUCTIVE ORGANS: Uterus and ovaries are surgically absent. URINARY BLADDER: No mass or calculus. BONES: Degenerative spine change. No acute fracture or aggressive lesion. ABDOMINAL WALL: No mass or hernia. ADDITIONAL COMMENTS: N/A    Impression  No acute findings or findings to correlate with right upper quadrant  abdominal pain. Reviewed records in [place] and [facility] tab today    Lab Review   Results for orders placed or performed in visit on 04/37/05   METABOLIC PANEL, COMPREHENSIVE   Result Value Ref Range    Glucose 138 (H) 65 - 99 mg/dL    BUN 10 6 - 24 mg/dL    Creatinine 0.58 0.57 - 1.00 mg/dL    GFR est non- >59 mL/min/1.73    GFR est  >59 mL/min/1.73    BUN/Creatinine ratio 17 9 - 23    Sodium 143 134 - 144 mmol/L    Potassium 4.1 3.5 - 5.2 mmol/L    Chloride 104 96 - 106 mmol/L    CO2 24 20 - 29 mmol/L    Calcium 9.4 8.7 - 10.2 mg/dL    Protein, total 7.1 6.0 - 8.5 g/dL    Albumin 4.5 3.8 - 4.9 g/dL    GLOBULIN, TOTAL 2.6 1.5 - 4.5 g/dL    A-G Ratio 1.7 1.2 - 2.2    Bilirubin, total <0.2 0.0 - 1.2 mg/dL    Alk.  phosphatase 97 44 - 121 IU/L    AST (SGOT) 11 0 - 40 IU/L ALT (SGPT) 17 0 - 32 IU/L   CBC WITH AUTOMATED DIFF   Result Value Ref Range    WBC 9.0 3.4 - 10.8 x10E3/uL    RBC 4.73 3.77 - 5.28 x10E6/uL    HGB 14.5 11.1 - 15.9 g/dL    HCT 41.9 34.0 - 46.6 %    MCV 89 79 - 97 fL    MCH 30.7 26.6 - 33.0 pg    MCHC 34.6 31.5 - 35.7 g/dL    RDW 12.2 11.7 - 15.4 %    PLATELET 693 245 - 922 x10E3/uL    NEUTROPHILS 61 Not Estab. %    Lymphocytes 32 Not Estab. %    MONOCYTES 6 Not Estab. %    EOSINOPHILS 1 Not Estab. %    BASOPHILS 0 Not Estab. %    ABS. NEUTROPHILS 5.4 1.4 - 7.0 x10E3/uL    Abs Lymphocytes 2.9 0.7 - 3.1 x10E3/uL    ABS. MONOCYTES 0.6 0.1 - 0.9 x10E3/uL    ABS. EOSINOPHILS 0.1 0.0 - 0.4 x10E3/uL    ABS. BASOPHILS 0.0 0.0 - 0.2 x10E3/uL    IMMATURE GRANULOCYTES 0 Not Estab. %    ABS. IMM. GRANS. 0.0 0.0 - 0.1 x10E3/uL   SED RATE (ESR)   Result Value Ref Range    Sed rate (ESR) 55 (H) 0 - 40 mm/hr   C REACTIVE PROTEIN, QT   Result Value Ref Range    C-Reactive Protein, Qt 8 0 - 10 mg/L   RHEUMASSURE   Result Value Ref Range    Rheumatoid Arthritis Factor <14.0 Units/mL    CCP Antibodies IgG/IgA <20 Units    14. 3.3 ETA, Rheum. Arthritis <0.20 ng/mL   LUIS DANIEL, RFLX TO 5-BIOMARKER PROFILE(SHARYN)   Result Value Ref Range    Antinuclear Antibodies Direct Negative Negative   CK   Result Value Ref Range    Creatine Kinase,Total 59 32 - 182 U/L        Exam:  Visit Vitals  BP (!) 136/92 (BP 1 Location: Right arm, BP Patient Position: Sitting, BP Cuff Size: Large adult)   Pulse 96   Resp 16   Ht 5' 3\" (1.6 m)   Wt 218 lb (98.9 kg)   SpO2 98%   BMI 38.62 kg/m²     General:  Alert, cooperative, no distress. Head:  Normocephalic, without obvious abnormality, atraumatic. MP class IV posterior OP   Respiratory:  Heart:   Non labored breathing  Regular rate and rhythm, no murmurs   Neck:   2+ carotids, no bruits   Extremities: Warm, no cyanosis or edema. Pulses: 2+ radial pulses. Neurologic:  MS: Alert and oriented x 4, speech intact. Language intact.  Attention and fund of knowledge appropriate. Recent and remote memory intact. Cranial Nerves:  II: visual fields Full to confrontation   II: pupils Equal, round, reactive to light   II: optic disc    III,VII: ptosis none   III,IV,VI: extraocular muscles  EOMI, no nystagmus, diplopia with up and to left gaze without abn eye movements   V: facial light touch sensation  normal   VII: facial muscle function   symmetric   VIII: hearing intact   IX: soft palate elevation  normal   XI: trapezius strength  5/5   XI: sternocleidomastoid strength 5/5   XII: tongue  Midline     Motor: normal bulk and tone, no tremor              Strength: 5/5 throughout, no PD  Sensory: intact to LT, PP except dec right foot. Coordination: FTN and HTS intact, AUTUMN intact  Gait: normal gait, able to tandem walk  Reflexes: 2+ symmetric, toes downgoing     Assessment/Plan   Pt is a 46 y.o. right handed female with DM with HgbA1C 7.2 on 8/23/21, HTN on 5 antihypertensive meds, HLD with  on last lipid panel 9/19/19, with onset of headaches at age 2yo, worsening in last couple of years. Pressure type pain is bifrontal and retro-orbital , may be posterior, associated with +N/V in past, +/-P/P, additionally may have swelling of her face, diplopia, flushed face. Occurring 4 times a month lasting 3 days. MRI brain w/wo contrast 11/22/21 with mild T2 hyperintensity in valeriy c/w CIWM changes, unchanged from MRI brain 5/13/20 for \"double vision and headache,\" or MRI brain w/wo [facility] for CADASIL\", or MRI brain w/wo 11/19/15 for right TN. MRA H/N 2/18/16 at [facility] ordered by Dr. Roseann Westbrook for facial pain - normal study. She has been on Topamax 25mg bid starting 2 years ago. Tried Treximet and Imitrex in past, currently taking Fioricet and Excedrin migraine 3-4 days a week.   Additionally, c/o a one year h/o bilateral intermittent LE weakness/tightness, severe aching pain, and tingling in legs with MRI L-spine 3/23/21 with mild stenosis at L3-L4 and diffuse less severe changes elsewhere, and NCS/EMG 4/2/21 of left LE that was normal. Extensive lab evaluation neg for autoimmune/inflammatory d/o. Exam with BMI 38.6, /92, MP class IV posterior OP, diplopia with up gaze to left without dysconjugate gaze seen, dec PP in right foot, o/w unremarkable exam.  Headache history is consistent with migraine headache, but may also have a component of rebound headache given frequent use of abortive medications. Additionally, body habitus, sleep history, and the fact that she is on 5 antihypertensive meds and still has poorly controlled blood pressure, it is concerning for ROMAN which could also be contributing to headaches. I recommend she stop Topamax given history of kidney stones and the fact that it is not controlling her headaches. Amitriptyline was not started due to risk of weight gain and worsening of possible sleep apnea. She is already on 5 antihypertensive medications. Recommend starting Ajovy 225 mg SQ monthly, side effects reviewed, first dose administered in the office by the LPN with instruction for self administration at home. Recommend starting Ubrelvy or Nurtec for abortive therapy, she was given samples of each to try and instructed to call to let us know which 1 she would prefer to have a prescription for. She was instructed to not take these on the same day. Patient cannot take triptans or ergotamine's due to multiple stroke risk factors. She should limit abortive medications of all types to 1-2 pills/week to avoid rebound headache. Patient is encouraged to continue her excellent water intake and limited caffeine intake. She is asked to keep a headache log and bring this to her follow-up appointment. I have no neurological explanation for her bilateral lower extremity complaints. There is no evidence of a large fiber neuropathy.  Her symptoms are not congruent with findings on MRI of the lumbar spine, normal exam, and normal NCS/EMG of the left lower extremity. Given her subjective numbness in the right foot, I suggested she talk to her orthopedist about having NCS/EMG of the right lower extremity for completeness. Follow-up in clinic in 4 months, instructed to call in the interim with any questions or concerns. ICD-10-CM ICD-9-CM    1. Migraine without aura and without status migrainosus, not intractable  G43.009 346.10 THER/PROPH/DIAG INJECTION, SUBCUT/IM   2. Risk factors for obstructive sleep apnea  Z91.89 V49.89 REFERRAL TO SLEEP STUDIES     I spent a total of 65 minutes in both face-to-face activities and non-face-to-face activities for the visit on the date of this encounter. Signed:   Ancelmo Berry MD  1/21/2022

## 2022-01-21 NOTE — PATIENT INSTRUCTIONS
- Please keep a headache log and bring to follow up appointment. Consider Migraine Jordy Destini  -Try Barry Berry and Nurtec, not on the same day, right at headache onset for abortive therapy of migraine.    Call and let me know which one you prefer and I will send a prescription .   -Limit abortive meds, all types, to 1-2 pills in a week

## 2022-01-21 NOTE — LETTER
1/22/2022    Patient: Ibis Rodriguez   YOB: 1970   Date of Visit: 1/21/2022     [address] [facility] Luis A    Dear Mei Mera MD,      Thank you for referring Ms. Kimberlee Spears to [address] [place] [address] for evaluation. My notes for this consultation are attached. If you have questions, please do not hesitate to call me. I look forward to following your patient along with you.       Sincerely,    Keke Madsen MD

## 2022-02-14 ENCOUNTER — TELEPHONE (OUTPATIENT)
Dept: NEUROLOGY | Age: 52
End: 2022-02-14

## 2022-02-14 NOTE — TELEPHONE ENCOUNTER
Patient is calling back to check on the PA on her Ajovy. She states that her next dose is 2/21/2022.

## 2022-02-24 PROBLEM — K80.00 ACUTE CHOLECYSTITIS DUE TO BILIARY CALCULUS: Status: RESOLVED | Noted: 2020-12-27 | Resolved: 2022-02-24

## 2022-02-24 PROBLEM — G43.909 MIGRAINE: Status: ACTIVE | Noted: 2022-02-24

## 2022-03-18 PROBLEM — G43.909 MIGRAINE: Status: ACTIVE | Noted: 2022-02-24

## 2022-03-19 PROBLEM — I15.2 HYPERTENSION COMPLICATING DIABETES (HCC): Status: ACTIVE | Noted: 2019-09-19

## 2022-03-19 PROBLEM — E11.59 HYPERTENSION COMPLICATING DIABETES (HCC): Status: ACTIVE | Noted: 2019-09-19

## 2022-03-19 PROBLEM — I10 HYPERTENSION COMPLICATING DIABETES (HCC): Status: ACTIVE | Noted: 2019-09-19

## 2022-03-19 PROBLEM — E11.9 HYPERTENSION COMPLICATING DIABETES (HCC): Status: ACTIVE | Noted: 2019-09-19

## 2022-03-19 PROBLEM — E11.9 CONTROLLED TYPE 2 DIABETES MELLITUS WITHOUT COMPLICATION, WITHOUT LONG-TERM CURRENT USE OF INSULIN (HCC): Status: ACTIVE | Noted: 2019-09-19

## 2022-03-19 PROBLEM — Z79.899 ENCOUNTER FOR LONG-TERM (CURRENT) USE OF OTHER MEDICATIONS: Status: ACTIVE | Noted: 2019-09-19

## 2022-03-20 PROBLEM — G50.0 TRIGEMINAL NEURALGIA OF RIGHT SIDE OF FACE: Status: ACTIVE | Noted: 2017-10-17

## 2022-04-21 ENCOUNTER — TELEPHONE (OUTPATIENT)
Dept: NEUROLOGY | Age: 52
End: 2022-04-21

## 2022-04-21 NOTE — TELEPHONE ENCOUNTER
Rachel - Please take care of this. The patient contacted our office 3 different times in February regarding her Ajovy PA. It appears, given no documentation, that all of her efforts were ignored. I contacted the pt in her 2/22/22 message and forwarded the message to Nanette Grewal. No response.

## 2022-04-28 ENCOUNTER — TELEPHONE (OUTPATIENT)
Dept: NEUROLOGY | Age: 52
End: 2022-04-28

## 2022-04-28 ENCOUNTER — PATIENT MESSAGE (OUTPATIENT)
Dept: NEUROLOGY | Age: 52
End: 2022-04-28

## 2022-04-28 NOTE — TELEPHONE ENCOUNTER
Re: Piper Lutz from manager, PA needed STAT. Initiated in [facility], key# [id number]. PA submitted. Awaiting update.

## 2022-05-02 NOTE — TELEPHONE ENCOUNTER
Re: Osmin Maza fax from [place] saying wrong form was used, completed and faxed out Urgent PA request for AJovy.

## 2022-05-13 NOTE — TELEPHONE ENCOUNTER
In routing comment, which I cannot see in the encounter, Ms. Jr Cruz documented the following:  Re: Neri Seek PA denial, per letter, pt must try and fail 2 prevention meds. [facility] has noted that topiramate has been tried. Beta blocker or amitriptyline/venlafaxine. Pt must also have a trial and failure of both Aimovig and Emgality. Pt has only been given sample of Emgality. Thanks. Of course, well documented in my note from 1/21/22, \" Amitriptyline was not started due to risk of weight gain and worsening of possible sleep apnea. She is already on 5 antihypertensive medications. \"   She is also on Prozac so starting a second SSRI, venlafaxine is also not appropriate. Carmencita Severance - Please resend the PA or appeal noting the above information.

## 2022-05-13 NOTE — TELEPHONE ENCOUNTER
Re: Agnes CARSON denial, per letter pt must ry and fail 2 month trial of 2 preventatives. Per letter pt has tried topiramate. Sent update to provider.

## 2023-02-21 ENCOUNTER — HOSPITAL ENCOUNTER (OUTPATIENT)
Dept: GENERAL RADIOLOGY | Age: 53
Discharge: HOME | End: 2023-02-21
Attending: INTERNAL MEDICINE
Payer: COMMERCIAL

## 2023-02-21 DIAGNOSIS — R10.11 RUQ PAIN: ICD-10-CM

## 2023-02-21 PROCEDURE — 74248 X-RAY SM INT F-THRU STD: CPT

## 2024-08-28 ENCOUNTER — HOSPITAL ENCOUNTER (OUTPATIENT)
Facility: HOSPITAL | Age: 54
Discharge: HOME | End: 2024-08-31
Attending: INTERNAL MEDICINE

## 2024-08-28 DIAGNOSIS — E11.9 CONTROLLED TYPE 2 DIABETES MELLITUS WITHOUT COMPLICATION, WITHOUT LONG-TERM CURRENT USE OF INSULIN (HCC): ICD-10-CM

## 2024-08-28 DIAGNOSIS — Z82.49 FAMILY HISTORY OF CORONARY ARTERY DISEASE: ICD-10-CM

## 2024-08-28 PROCEDURE — 75571 CT HRT W/O DYE W/CA TEST: CPT

## 2024-12-13 ENCOUNTER — HOSPITAL ENCOUNTER (OUTPATIENT)
Age: 54
Discharge: HOME | End: 2024-12-16
Payer: COMMERCIAL

## 2024-12-13 DIAGNOSIS — M54.50 LOW BACK PAIN, UNSPECIFIED BACK PAIN LATERALITY, UNSPECIFIED CHRONICITY, UNSPECIFIED WHETHER SCIATICA PRESENT: ICD-10-CM

## 2024-12-13 DIAGNOSIS — M48.061 SPINAL STENOSIS OF LUMBAR REGION, UNSPECIFIED WHETHER NEUROGENIC CLAUDICATION PRESENT: ICD-10-CM

## 2024-12-13 DIAGNOSIS — R20.0 BILATERAL LEG NUMBNESS: ICD-10-CM

## 2024-12-13 PROCEDURE — 72148 MRI LUMBAR SPINE W/O DYE: CPT

## 2024-12-16 NOTE — RESULT ENCOUNTER NOTE
D/w Miley -- I rec she f/u with her pain specialist for consideration of another JESSICA.  She will f/u with Dr. Morales.

## 2025-01-28 ENCOUNTER — HOSPITAL ENCOUNTER (OUTPATIENT)
Facility: HOSPITAL | Age: 55
Discharge: HOME | End: 2025-01-31
Payer: COMMERCIAL

## 2025-01-28 DIAGNOSIS — M79.602 PAIN OF LEFT UPPER EXTREMITY: ICD-10-CM

## 2025-01-28 PROCEDURE — 73060 X-RAY EXAM OF HUMERUS: CPT

## 2025-04-16 ENCOUNTER — HOSPITAL ENCOUNTER (OUTPATIENT)
Age: 55
Discharge: HOME | End: 2025-04-19
Payer: COMMERCIAL

## 2025-04-16 DIAGNOSIS — Z78.0 ASYMPTOMATIC MENOPAUSAL STATE: ICD-10-CM

## 2025-04-16 PROCEDURE — 77080 DXA BONE DENSITY AXIAL: CPT

## (undated) DEVICE — CLICKLINE SCISSORS INSERT: Brand: CLICKLINE

## (undated) DEVICE — MAX-CORE® DISPOSABLE CORE BIOPSY INSTRUMENT, 18G X 20CM: Brand: MAX-CORE

## (undated) DEVICE — 4-PORT MANIFOLD: Brand: NEPTUNE 2

## (undated) DEVICE — BAG SPEC REM 224ML W4XL6IN DIA10MM 1 HND GYN DISP ENDOPCH

## (undated) DEVICE — SUTURE PDS II SZ 0 L36IN ABSRB VLT L36MM CT-1 1/2 CIR Z346H

## (undated) DEVICE — LAPAROSCOPIC TROCAR SLEEVE/SINGLE USE: Brand: KII® OPTICAL ACCESS SYSTEM

## (undated) DEVICE — ELECTRODE ES 36CM LAP FLAT L HK COAT DISP CLEANCOAT

## (undated) DEVICE — STERILE POLYISOPRENE POWDER-FREE SURGICAL GLOVES WITH EMOLLIENT COATING: Brand: PROTEXIS

## (undated) DEVICE — PREP SKN CHLRAPRP APL 26ML STR --

## (undated) DEVICE — Z DISCONTINUED NO SUB IDED SET EXTN W/ 4 W STPCOCK M SPIN LOK 36IN

## (undated) DEVICE — SUTURE MCRYL SZ 4-0 L27IN ABSRB UD L19MM PS-2 1/2 CIR PRIM Y426H

## (undated) DEVICE — DRAPE,UTILTY,TAPE,15X26, 4EA/PK: Brand: MEDLINE

## (undated) DEVICE — DERMABOND SKIN ADH 0.7ML -- DERMABOND ADVANCED 12/BX

## (undated) DEVICE — TROCAR: Brand: KII® SLEEVE

## (undated) DEVICE — STRAP,POSITIONING,KNEE/BODY,FOAM,4X60": Brand: MEDLINE

## (undated) DEVICE — Device

## (undated) DEVICE — GARMENT,MEDLINE,DVT,INT,CALF,MED, GEN2: Brand: MEDLINE

## (undated) DEVICE — TUBING, SUCTION, 1/4" X 12', STRAIGHT: Brand: MEDLINE

## (undated) DEVICE — SURGICAL PROCEDURE KIT GEN LAPAROSCOPY LF

## (undated) DEVICE — REM POLYHESIVE ADULT PATIENT RETURN ELECTRODE: Brand: VALLEYLAB

## (undated) DEVICE — TROCAR: Brand: KII® OPTICAL ACCESS SYSTEM

## (undated) DEVICE — APPLIER CLP M/L SHFT DIA5MM 15 LIG LIGAMAX 5

## (undated) DEVICE — FILTER SMK EVAC FLO CLR MEGADYNE

## (undated) DEVICE — NEEDLE HYPO 22GA L1.5IN BLK S STL HUB POLYPR SHLD REG BVL

## (undated) DEVICE — INFECTION CONTROL KIT SYS